# Patient Record
Sex: MALE | Race: WHITE | NOT HISPANIC OR LATINO | Employment: OTHER | URBAN - METROPOLITAN AREA
[De-identification: names, ages, dates, MRNs, and addresses within clinical notes are randomized per-mention and may not be internally consistent; named-entity substitution may affect disease eponyms.]

---

## 2019-01-01 ENCOUNTER — APPOINTMENT (OUTPATIENT)
Dept: CT IMAGING | Facility: HOSPITAL | Age: 74
End: 2019-01-01

## 2019-01-01 ENCOUNTER — APPOINTMENT (OUTPATIENT)
Dept: ULTRASOUND IMAGING | Facility: HOSPITAL | Age: 74
End: 2019-01-01

## 2019-01-01 ENCOUNTER — APPOINTMENT (OUTPATIENT)
Dept: GENERAL RADIOLOGY | Facility: HOSPITAL | Age: 74
End: 2019-01-01

## 2019-01-01 ENCOUNTER — ANESTHESIA (OUTPATIENT)
Dept: GASTROENTEROLOGY | Facility: HOSPITAL | Age: 74
End: 2019-01-01

## 2019-01-01 ENCOUNTER — HOSPITAL ENCOUNTER (EMERGENCY)
Facility: HOSPITAL | Age: 74
Discharge: HOME | End: 2019-04-02
Attending: EMERGENCY MEDICINE | Admitting: EMERGENCY MEDICINE

## 2019-01-01 ENCOUNTER — TREATMENT (OUTPATIENT)
Dept: PHYSICAL THERAPY | Facility: CLINIC | Age: 74
End: 2019-01-01

## 2019-01-01 ENCOUNTER — APPOINTMENT (OUTPATIENT)
Dept: CARDIOLOGY | Facility: HOSPITAL | Age: 74
End: 2019-01-01

## 2019-01-01 ENCOUNTER — HOSPITAL ENCOUNTER (INPATIENT)
Facility: HOSPITAL | Age: 74
LOS: 6 days | Discharge: HOME | End: 2019-12-18
Attending: EMERGENCY MEDICINE | Admitting: HOSPITALIST

## 2019-01-01 ENCOUNTER — OFFICE VISIT (OUTPATIENT)
Dept: INTERNAL MEDICINE | Facility: CLINIC | Age: 74
End: 2019-01-01

## 2019-01-01 ENCOUNTER — READMISSION MANAGEMENT (OUTPATIENT)
Dept: CALL CENTER | Facility: HOSPITAL | Age: 74
End: 2019-01-01

## 2019-01-01 ENCOUNTER — OFFICE VISIT (OUTPATIENT)
Dept: ORTHOPEDIC SURGERY | Facility: CLINIC | Age: 74
End: 2019-01-01

## 2019-01-01 ENCOUNTER — TELEPHONE (OUTPATIENT)
Dept: INTERNAL MEDICINE | Facility: CLINIC | Age: 74
End: 2019-01-01

## 2019-01-01 ENCOUNTER — HOSPITAL ENCOUNTER (OUTPATIENT)
Dept: GENERAL RADIOLOGY | Facility: HOSPITAL | Age: 74
Discharge: HOME | End: 2019-12-11
Admitting: FAMILY MEDICINE

## 2019-01-01 ENCOUNTER — PATIENT OUTREACH (OUTPATIENT)
Dept: CASE MANAGEMENT | Facility: OTHER | Age: 74
End: 2019-01-01

## 2019-01-01 ENCOUNTER — ANESTHESIA EVENT (OUTPATIENT)
Dept: GASTROENTEROLOGY | Facility: HOSPITAL | Age: 74
End: 2019-01-01

## 2019-01-01 ENCOUNTER — APPOINTMENT (OUTPATIENT)
Dept: LAB | Facility: HOSPITAL | Age: 74
End: 2019-01-01

## 2019-01-01 ENCOUNTER — NURSE TRIAGE (OUTPATIENT)
Dept: CALL CENTER | Facility: HOSPITAL | Age: 74
End: 2019-01-01

## 2019-01-01 VITALS
HEIGHT: 72 IN | WEIGHT: 315 LBS | DIASTOLIC BLOOD PRESSURE: 71 MMHG | HEART RATE: 66 BPM | TEMPERATURE: 98.9 F | RESPIRATION RATE: 18 BRPM | BODY MASS INDEX: 42.66 KG/M2 | OXYGEN SATURATION: 95 % | SYSTOLIC BLOOD PRESSURE: 154 MMHG

## 2019-01-01 VITALS
BODY MASS INDEX: 38.13 KG/M2 | SYSTOLIC BLOOD PRESSURE: 99 MMHG | HEART RATE: 72 BPM | WEIGHT: 287.7 LBS | RESPIRATION RATE: 20 BRPM | TEMPERATURE: 98.2 F | OXYGEN SATURATION: 91 % | DIASTOLIC BLOOD PRESSURE: 57 MMHG | HEIGHT: 73 IN

## 2019-01-01 VITALS
HEIGHT: 72 IN | DIASTOLIC BLOOD PRESSURE: 89 MMHG | RESPIRATION RATE: 20 BRPM | OXYGEN SATURATION: 97 % | WEIGHT: 315 LBS | SYSTOLIC BLOOD PRESSURE: 135 MMHG | TEMPERATURE: 97.7 F | HEART RATE: 62 BPM | BODY MASS INDEX: 42.66 KG/M2

## 2019-01-01 VITALS — HEIGHT: 72 IN | BODY MASS INDEX: 42.66 KG/M2 | TEMPERATURE: 98 F | WEIGHT: 315 LBS

## 2019-01-01 VITALS
BODY MASS INDEX: 41.75 KG/M2 | HEIGHT: 73 IN | SYSTOLIC BLOOD PRESSURE: 142 MMHG | OXYGEN SATURATION: 98 % | WEIGHT: 315 LBS | HEART RATE: 71 BPM | DIASTOLIC BLOOD PRESSURE: 70 MMHG

## 2019-01-01 VITALS
SYSTOLIC BLOOD PRESSURE: 142 MMHG | HEART RATE: 58 BPM | BODY MASS INDEX: 41.75 KG/M2 | DIASTOLIC BLOOD PRESSURE: 80 MMHG | OXYGEN SATURATION: 95 % | HEIGHT: 73 IN | WEIGHT: 315 LBS

## 2019-01-01 DIAGNOSIS — I10 ESSENTIAL HYPERTENSION: ICD-10-CM

## 2019-01-01 DIAGNOSIS — K21.9 GASTROESOPHAGEAL REFLUX DISEASE WITHOUT ESOPHAGITIS: ICD-10-CM

## 2019-01-01 DIAGNOSIS — R29.898 LEG WEAKNESS, BILATERAL: Primary | ICD-10-CM

## 2019-01-01 DIAGNOSIS — E78.5 HYPERLIPIDEMIA, UNSPECIFIED HYPERLIPIDEMIA TYPE: ICD-10-CM

## 2019-01-01 DIAGNOSIS — Z79.4 TYPE 2 DIABETES MELLITUS WITH COMPLICATION, WITH LONG-TERM CURRENT USE OF INSULIN (HCC): ICD-10-CM

## 2019-01-01 DIAGNOSIS — R91.8 RIGHT LOWER LOBE LUNG MASS: ICD-10-CM

## 2019-01-01 DIAGNOSIS — N40.0 BENIGN PROSTATIC HYPERPLASIA WITHOUT LOWER URINARY TRACT SYMPTOMS: ICD-10-CM

## 2019-01-01 DIAGNOSIS — R07.89 ATYPICAL CHEST PAIN: Primary | ICD-10-CM

## 2019-01-01 DIAGNOSIS — I71.20 THORACIC AORTIC ANEURYSM WITHOUT RUPTURE (HCC): ICD-10-CM

## 2019-01-01 DIAGNOSIS — E11.8 TYPE 2 DIABETES MELLITUS WITH COMPLICATION, WITH LONG-TERM CURRENT USE OF INSULIN (HCC): ICD-10-CM

## 2019-01-01 DIAGNOSIS — E11.00 TYPE 2 DIABETES MELLITUS WITH HYPEROSMOLARITY WITHOUT COMA, UNSPECIFIED WHETHER LONG TERM INSULIN USE (HCC): Primary | ICD-10-CM

## 2019-01-01 DIAGNOSIS — J18.9 COMMUNITY ACQUIRED PNEUMONIA OF RIGHT LOWER LOBE OF LUNG: Primary | ICD-10-CM

## 2019-01-01 DIAGNOSIS — Z96.652 HX OF TOTAL KNEE ARTHROPLASTY, LEFT: Primary | ICD-10-CM

## 2019-01-01 DIAGNOSIS — R06.02 SHORTNESS OF BREATH ON EXERTION: ICD-10-CM

## 2019-01-01 DIAGNOSIS — F32.A DEPRESSION, UNSPECIFIED DEPRESSION TYPE: ICD-10-CM

## 2019-01-01 DIAGNOSIS — M25.661 KNEE JOINT STIFFNESS, BILATERAL: ICD-10-CM

## 2019-01-01 DIAGNOSIS — Z79.4 TYPE 2 DIABETES MELLITUS WITH COMPLICATION, WITH LONG-TERM CURRENT USE OF INSULIN (HCC): Primary | ICD-10-CM

## 2019-01-01 DIAGNOSIS — R06.02 SHORTNESS OF BREATH ON EXERTION: Primary | ICD-10-CM

## 2019-01-01 DIAGNOSIS — F39 UNSPECIFIED MOOD (AFFECTIVE) DISORDER (HCC): ICD-10-CM

## 2019-01-01 DIAGNOSIS — J96.01 ACUTE HYPOXEMIC RESPIRATORY FAILURE (HCC): Primary | ICD-10-CM

## 2019-01-01 DIAGNOSIS — M25.562 ACUTE PAIN OF LEFT KNEE: ICD-10-CM

## 2019-01-01 DIAGNOSIS — Z74.09 DECREASED MOBILITY AND ENDURANCE: ICD-10-CM

## 2019-01-01 DIAGNOSIS — M25.662 KNEE JOINT STIFFNESS, BILATERAL: ICD-10-CM

## 2019-01-01 DIAGNOSIS — E11.8 TYPE 2 DIABETES MELLITUS WITH COMPLICATION, WITH LONG-TERM CURRENT USE OF INSULIN (HCC): Primary | ICD-10-CM

## 2019-01-01 DIAGNOSIS — R06.02 SHORTNESS OF BREATH: ICD-10-CM

## 2019-01-01 DIAGNOSIS — E27.8 LEFT ADRENAL MASS (HCC): ICD-10-CM

## 2019-01-01 DIAGNOSIS — N52.9 ERECTILE DYSFUNCTION, UNSPECIFIED ERECTILE DYSFUNCTION TYPE: ICD-10-CM

## 2019-01-01 DIAGNOSIS — J90 PLEURAL EFFUSION, RIGHT: ICD-10-CM

## 2019-01-01 DIAGNOSIS — E11.9 TYPE 2 DIABETES MELLITUS WITHOUT COMPLICATION, WITH LONG-TERM CURRENT USE OF INSULIN (HCC): ICD-10-CM

## 2019-01-01 DIAGNOSIS — J90 PLEURAL EFFUSION ON RIGHT: ICD-10-CM

## 2019-01-01 DIAGNOSIS — Z79.4 TYPE 2 DIABETES MELLITUS WITHOUT COMPLICATION, WITH LONG-TERM CURRENT USE OF INSULIN (HCC): ICD-10-CM

## 2019-01-01 LAB
ALBUMIN FLD-MCNC: 3.1 G/DL
ALBUMIN SERPL-MCNC: 3.2 G/DL (ref 3.5–5.2)
ALBUMIN SERPL-MCNC: 3.6 G/DL (ref 3.5–5.2)
ALBUMIN SERPL-MCNC: 4.1 G/DL (ref 3.5–5.2)
ALBUMIN SERPL-MCNC: 4.2 G/DL (ref 3.5–5.2)
ALBUMIN SERPL-MCNC: 4.3 G/DL (ref 3.5–5.2)
ALBUMIN SERPL-MCNC: 4.4 G/DL (ref 3.5–5.2)
ALBUMIN/GLOB SERPL: 0.9 G/DL
ALBUMIN/GLOB SERPL: 1.1 G/DL
ALBUMIN/GLOB SERPL: 1.2 G/DL
ALBUMIN/GLOB SERPL: 1.5 G/DL
ALBUMIN/GLOB SERPL: 1.5 G/DL
ALP SERPL-CCNC: 40 U/L (ref 39–117)
ALP SERPL-CCNC: 54 U/L (ref 39–117)
ALP SERPL-CCNC: 60 U/L (ref 39–117)
ALP SERPL-CCNC: 67 U/L (ref 39–117)
ALP SERPL-CCNC: 70 U/L (ref 39–117)
ALP SERPL-CCNC: 77 U/L (ref 39–117)
ALT SERPL W P-5'-P-CCNC: 15 U/L (ref 1–41)
ALT SERPL W P-5'-P-CCNC: 17 U/L (ref 1–41)
ALT SERPL W P-5'-P-CCNC: 18 U/L (ref 1–41)
ALT SERPL W P-5'-P-CCNC: 19 U/L (ref 1–41)
ALT SERPL W P-5'-P-CCNC: 21 U/L (ref 1–41)
ALT SERPL-CCNC: 14 U/L (ref 1–41)
ANION GAP SERPL CALCULATED.3IONS-SCNC: 10 MMOL/L
ANION GAP SERPL CALCULATED.3IONS-SCNC: 10.5 MMOL/L (ref 5–15)
ANION GAP SERPL CALCULATED.3IONS-SCNC: 10.6 MMOL/L (ref 5–15)
ANION GAP SERPL CALCULATED.3IONS-SCNC: 10.7 MMOL/L (ref 5–15)
ANION GAP SERPL CALCULATED.3IONS-SCNC: 11.1 MMOL/L (ref 5–15)
ANION GAP SERPL CALCULATED.3IONS-SCNC: 14.7 MMOL/L (ref 5–15)
ANION GAP SERPL CALCULATED.3IONS-SCNC: 8.3 MMOL/L (ref 5–15)
ANION GAP SERPL CALCULATED.3IONS-SCNC: 9.3 MMOL/L (ref 5–15)
ANION GAP SERPL CALCULATED.3IONS-SCNC: 9.3 MMOL/L (ref 5–15)
ANION GAP SERPL CALCULATED.3IONS-SCNC: 9.9 MMOL/L (ref 5–15)
ANISOCYTOSIS BLD QL: ABNORMAL
AORTIC DIMENSIONLESS INDEX: 0.8 (DI)
APPEARANCE FLD: ABNORMAL
APTT PPP: 31.6 SECONDS (ref 22.7–35.4)
APTT PPP: 33.9 SECONDS (ref 22.7–35.4)
ARTERIAL PATENCY WRIST A: POSITIVE
AST SERPL-CCNC: 19 U/L (ref 1–40)
AST SERPL-CCNC: 19 U/L (ref 1–40)
AST SERPL-CCNC: 20 U/L (ref 1–40)
AST SERPL-CCNC: 21 U/L (ref 1–40)
AST SERPL-CCNC: 21 U/L (ref 1–40)
AST SERPL-CCNC: 22 U/L (ref 1–40)
ATMOSPHERIC PRESS: 750.3 MMHG
BACTERIA FLD CULT: NORMAL
BACTERIA SPEC AEROBE CULT: ABNORMAL
BACTERIA SPEC AEROBE CULT: NORMAL
BACTERIA SPEC AEROBE CULT: NORMAL
BACTERIA SPEC ANAEROBE CULT: NORMAL
BASE EXCESS BLDA CALC-SCNC: 6.4 MMOL/L (ref 0–2)
BASOPHILS # BLD AUTO: 0.01 10*3/MM3 (ref 0–0.2)
BASOPHILS # BLD AUTO: 0.04 10*3/MM3 (ref 0–0.2)
BASOPHILS # BLD AUTO: 0.04 10*3/MM3 (ref 0–0.2)
BASOPHILS NFR BLD AUTO: 0.2 % (ref 0–1.5)
BASOPHILS NFR BLD AUTO: 0.6 % (ref 0–1.5)
BASOPHILS NFR BLD AUTO: 0.7 % (ref 0–1.5)
BDY SITE: ABNORMAL
BH CV ECHO MEAS - ACS: 2 CM
BH CV ECHO MEAS - AO MAX PG: 10 MMHG
BH CV ECHO MEAS - AO MEAN PG (FULL): 2 MMHG
BH CV ECHO MEAS - AO MEAN PG: 5 MMHG
BH CV ECHO MEAS - AO ROOT AREA (BSA CORRECTED): 1.3
BH CV ECHO MEAS - AO ROOT AREA: 8.6 CM^2
BH CV ECHO MEAS - AO ROOT DIAM: 3.3 CM
BH CV ECHO MEAS - AO V2 MAX: 154 CM/SEC
BH CV ECHO MEAS - AO V2 MEAN: 107 CM/SEC
BH CV ECHO MEAS - AO V2 VTI: 32.5 CM
BH CV ECHO MEAS - AVA(I,A): 3.5 CM^2
BH CV ECHO MEAS - AVA(I,D): 3.5 CM^2
BH CV ECHO MEAS - BSA(HAYCOCK): 2.8 M^2
BH CV ECHO MEAS - BSA: 2.6 M^2
BH CV ECHO MEAS - BZI_BMI: 42.1 KILOGRAMS/M^2
BH CV ECHO MEAS - BZI_METRIC_HEIGHT: 185.4 CM
BH CV ECHO MEAS - BZI_METRIC_WEIGHT: 144.7 KG
BH CV ECHO MEAS - EDV(CUBED): 74.1 ML
BH CV ECHO MEAS - EDV(MOD-SP2): 125 ML
BH CV ECHO MEAS - EDV(MOD-SP4): 106 ML
BH CV ECHO MEAS - EDV(TEICH): 78.6 ML
BH CV ECHO MEAS - EF(CUBED): 19.9 %
BH CV ECHO MEAS - EF(MOD-BP): 75 %
BH CV ECHO MEAS - EF(MOD-SP2): 71.2 %
BH CV ECHO MEAS - EF(MOD-SP4): 71.7 %
BH CV ECHO MEAS - EF(TEICH): 16.1 %
BH CV ECHO MEAS - ESV(CUBED): 59.3 ML
BH CV ECHO MEAS - ESV(MOD-SP2): 36 ML
BH CV ECHO MEAS - ESV(MOD-SP4): 30 ML
BH CV ECHO MEAS - ESV(TEICH): 65.9 ML
BH CV ECHO MEAS - FS: 7.1 %
BH CV ECHO MEAS - IVS/LVPW: 0.88
BH CV ECHO MEAS - IVSD: 1.5 CM
BH CV ECHO MEAS - LA DIMENSION: 5.2 CM
BH CV ECHO MEAS - LA/AO: 1.6
BH CV ECHO MEAS - LAT PEAK E' VEL: 10.6 CM/SEC
BH CV ECHO MEAS - LV DIASTOLIC VOL/BSA (35-75): 40.4 ML/M^2
BH CV ECHO MEAS - LV MASS(C)D: 276.1 GRAMS
BH CV ECHO MEAS - LV MASS(C)DI: 105.2 GRAMS/M^2
BH CV ECHO MEAS - LV MEAN PG: 3 MMHG
BH CV ECHO MEAS - LV SYSTOLIC VOL/BSA (12-30): 11.4 ML/M^2
BH CV ECHO MEAS - LV V1 MAX: 118 CM/SEC
BH CV ECHO MEAS - LV V1 MEAN: 78.4 CM/SEC
BH CV ECHO MEAS - LV V1 VTI: 27.5 CM
BH CV ECHO MEAS - LVIDD: 4.2 CM
BH CV ECHO MEAS - LVIDS: 3.9 CM
BH CV ECHO MEAS - LVLD AP2: 8.6 CM
BH CV ECHO MEAS - LVLD AP4: 8.3 CM
BH CV ECHO MEAS - LVLS AP2: 6.8 CM
BH CV ECHO MEAS - LVLS AP4: 7 CM
BH CV ECHO MEAS - LVOT AREA (M): 4.2 CM^2
BH CV ECHO MEAS - LVOT AREA: 4.2 CM^2
BH CV ECHO MEAS - LVOT DIAM: 2.3 CM
BH CV ECHO MEAS - LVPWD: 1.7 CM
BH CV ECHO MEAS - MED PEAK E' VEL: 10 CM/SEC
BH CV ECHO MEAS - MV A DUR: 0.22 SEC
BH CV ECHO MEAS - MV A MAX VEL: 112 CM/SEC
BH CV ECHO MEAS - MV DEC SLOPE: 481 CM/SEC^2
BH CV ECHO MEAS - MV DEC TIME: 0.24 SEC
BH CV ECHO MEAS - MV E MAX VEL: 129 CM/SEC
BH CV ECHO MEAS - MV E/A: 1.2
BH CV ECHO MEAS - MV MEAN PG: 2 MMHG
BH CV ECHO MEAS - MV P1/2T MAX VEL: 106 CM/SEC
BH CV ECHO MEAS - MV P1/2T: 64.5 MSEC
BH CV ECHO MEAS - MV V2 MEAN: 62.9 CM/SEC
BH CV ECHO MEAS - MV V2 VTI: 39.8 CM
BH CV ECHO MEAS - MVA P1/2T LCG: 2.1 CM^2
BH CV ECHO MEAS - MVA(P1/2T): 3.4 CM^2
BH CV ECHO MEAS - MVA(VTI): 2.9 CM^2
BH CV ECHO MEAS - PA ACC SLOPE: 1322 CM/SEC^2
BH CV ECHO MEAS - PA ACC TIME: 0.07 SEC
BH CV ECHO MEAS - PA MAX PG (FULL): 1.6 MMHG
BH CV ECHO MEAS - PA MAX PG: 3.8 MMHG
BH CV ECHO MEAS - PA PR(ACCEL): 45.7 MMHG
BH CV ECHO MEAS - PA V2 MAX: 97.7 CM/SEC
BH CV ECHO MEAS - PULM A REVS DUR: 0.16 SEC
BH CV ECHO MEAS - PULM A REVS VEL: 23.3 CM/SEC
BH CV ECHO MEAS - PULM DIAS VEL: 36.3 CM/SEC
BH CV ECHO MEAS - PULM S/D: 0.94
BH CV ECHO MEAS - PULM SYS VEL: 34.3 CM/SEC
BH CV ECHO MEAS - PVA(V,A): 4 CM^2
BH CV ECHO MEAS - PVA(V,D): 4 CM^2
BH CV ECHO MEAS - QP/QS: 0.66
BH CV ECHO MEAS - RV MAX PG: 2.2 MMHG
BH CV ECHO MEAS - RV MEAN PG: 1 MMHG
BH CV ECHO MEAS - RV V1 MAX: 73.7 CM/SEC
BH CV ECHO MEAS - RV V1 MEAN: 45.7 CM/SEC
BH CV ECHO MEAS - RV V1 VTI: 14.1 CM
BH CV ECHO MEAS - RVOT AREA: 5.3 CM^2
BH CV ECHO MEAS - RVOT DIAM: 2.6 CM
BH CV ECHO MEAS - SI(AO): 105.9 ML/M^2
BH CV ECHO MEAS - SI(CUBED): 5.6 ML/M^2
BH CV ECHO MEAS - SI(LVOT): 43.5 ML/M^2
BH CV ECHO MEAS - SI(MOD-SP2): 33.9 ML/M^2
BH CV ECHO MEAS - SI(MOD-SP4): 29 ML/M^2
BH CV ECHO MEAS - SI(TEICH): 4.8 ML/M^2
BH CV ECHO MEAS - SV(AO): 278 ML
BH CV ECHO MEAS - SV(CUBED): 14.8 ML
BH CV ECHO MEAS - SV(LVOT): 114.3 ML
BH CV ECHO MEAS - SV(MOD-SP2): 89 ML
BH CV ECHO MEAS - SV(MOD-SP4): 76 ML
BH CV ECHO MEAS - SV(RVOT): 74.9 ML
BH CV ECHO MEAS - SV(TEICH): 12.7 ML
BH CV ECHO MEAS - TAPSE (>1.6): 2.7 CM2
BH CV ECHO MEASUREMENTS AVERAGE E/E' RATIO: 12.52
BH CV LOWER VASCULAR LEFT COMMON FEMORAL AUGMENT: NORMAL
BH CV LOWER VASCULAR LEFT COMMON FEMORAL COMPETENT: NORMAL
BH CV LOWER VASCULAR LEFT COMMON FEMORAL COMPRESS: NORMAL
BH CV LOWER VASCULAR LEFT COMMON FEMORAL PHASIC: NORMAL
BH CV LOWER VASCULAR LEFT COMMON FEMORAL SPONT: NORMAL
BH CV LOWER VASCULAR LEFT DISTAL FEMORAL COMPRESS: NORMAL
BH CV LOWER VASCULAR LEFT GASTRONEMIUS COMPRESS: NORMAL
BH CV LOWER VASCULAR LEFT GREATER SAPH BK COMPRESS: NORMAL
BH CV LOWER VASCULAR LEFT MID FEMORAL AUGMENT: NORMAL
BH CV LOWER VASCULAR LEFT MID FEMORAL COMPETENT: NORMAL
BH CV LOWER VASCULAR LEFT MID FEMORAL COMPRESS: NORMAL
BH CV LOWER VASCULAR LEFT MID FEMORAL PHASIC: NORMAL
BH CV LOWER VASCULAR LEFT MID FEMORAL SPONT: NORMAL
BH CV LOWER VASCULAR LEFT PERONEAL COMPRESS: NORMAL
BH CV LOWER VASCULAR LEFT POPLITEAL AUGMENT: NORMAL
BH CV LOWER VASCULAR LEFT POPLITEAL COMPETENT: NORMAL
BH CV LOWER VASCULAR LEFT POPLITEAL COMPRESS: NORMAL
BH CV LOWER VASCULAR LEFT POPLITEAL PHASIC: NORMAL
BH CV LOWER VASCULAR LEFT POPLITEAL SPONT: NORMAL
BH CV LOWER VASCULAR LEFT POSTERIOR TIBIAL COMPRESS: NORMAL
BH CV LOWER VASCULAR LEFT PROFUNDA FEMORAL COMPRESS: NORMAL
BH CV LOWER VASCULAR LEFT PROXIMAL FEMORAL COMPRESS: NORMAL
BH CV LOWER VASCULAR LEFT SAPHENOFEMORAL JUNCTION COMPRESS: NORMAL
BH CV LOWER VASCULAR RIGHT COMMON FEMORAL AUGMENT: NORMAL
BH CV LOWER VASCULAR RIGHT COMMON FEMORAL COMPETENT: NORMAL
BH CV LOWER VASCULAR RIGHT COMMON FEMORAL COMPRESS: NORMAL
BH CV LOWER VASCULAR RIGHT COMMON FEMORAL PHASIC: NORMAL
BH CV LOWER VASCULAR RIGHT COMMON FEMORAL SPONT: NORMAL
BH CV LOWER VASCULAR RIGHT DISTAL FEMORAL COMPRESS: NORMAL
BH CV LOWER VASCULAR RIGHT GASTRONEMIUS COMPRESS: NORMAL
BH CV LOWER VASCULAR RIGHT GREATER SAPH AK COMPRESS: NORMAL
BH CV LOWER VASCULAR RIGHT GREATER SAPH BK COMPRESS: NORMAL
BH CV LOWER VASCULAR RIGHT MID FEMORAL AUGMENT: NORMAL
BH CV LOWER VASCULAR RIGHT MID FEMORAL COMPETENT: NORMAL
BH CV LOWER VASCULAR RIGHT MID FEMORAL COMPRESS: NORMAL
BH CV LOWER VASCULAR RIGHT MID FEMORAL PHASIC: NORMAL
BH CV LOWER VASCULAR RIGHT MID FEMORAL SPONT: NORMAL
BH CV LOWER VASCULAR RIGHT PERONEAL COMPRESS: NORMAL
BH CV LOWER VASCULAR RIGHT POPLITEAL AUGMENT: NORMAL
BH CV LOWER VASCULAR RIGHT POPLITEAL COMPETENT: NORMAL
BH CV LOWER VASCULAR RIGHT POPLITEAL COMPRESS: NORMAL
BH CV LOWER VASCULAR RIGHT POPLITEAL PHASIC: NORMAL
BH CV LOWER VASCULAR RIGHT POPLITEAL SPONT: NORMAL
BH CV LOWER VASCULAR RIGHT POSTERIOR TIBIAL COMPRESS: NORMAL
BH CV LOWER VASCULAR RIGHT PROFUNDA FEMORAL COMPRESS: NORMAL
BH CV LOWER VASCULAR RIGHT PROXIMAL FEMORAL COMPRESS: NORMAL
BH CV LOWER VASCULAR RIGHT SAPHENOFEMORAL JUNCTION COMPRESS: NORMAL
BH CV XLRA - RV BASE: 3.8 CM
BH CV XLRA - TDI S': 12 CM/SEC
BILIRUB CONJ SERPL-MCNC: 0.2 MG/DL (ref 0.2–0.3)
BILIRUB INDIRECT SERPL-MCNC: 0.4 MG/DL
BILIRUB SERPL-MCNC: 0.4 MG/DL (ref 0.1–1.2)
BILIRUB SERPL-MCNC: 0.4 MG/DL (ref 0.2–1.2)
BILIRUB SERPL-MCNC: 0.6 MG/DL (ref 0.2–1.2)
BILIRUB SERPL-MCNC: 0.7 MG/DL (ref 0.2–1.2)
BILIRUB SERPL-MCNC: 0.7 MG/DL (ref 0.2–1.2)
BILIRUB SERPL-MCNC: 0.8 MG/DL (ref 0.2–1.2)
BUN BLD-MCNC: 16 MG/DL (ref 8–23)
BUN BLD-MCNC: 20 MG/DL (ref 8–23)
BUN BLD-MCNC: 22 MG/DL (ref 8–23)
BUN BLD-MCNC: 23 MG/DL (ref 8–23)
BUN BLD-MCNC: 24 MG/DL (ref 8–23)
BUN BLD-MCNC: 25 MG/DL (ref 8–23)
BUN BLD-MCNC: 26 MG/DL (ref 8–23)
BUN SERPL-MCNC: 27 MG/DL (ref 8–23)
BUN/CREAT SERPL: 12.8 (ref 7–25)
BUN/CREAT SERPL: 14 (ref 7–25)
BUN/CREAT SERPL: 14.6 (ref 7–25)
BUN/CREAT SERPL: 14.7 (ref 7–25)
BUN/CREAT SERPL: 15.3 (ref 7–25)
BUN/CREAT SERPL: 15.5 (ref 7–25)
BUN/CREAT SERPL: 15.8 (ref 7–25)
BUN/CREAT SERPL: 16.5 (ref 7–25)
BUN/CREAT SERPL: 17.8 (ref 7–25)
BUN/CREAT SERPL: 17.9 (ref 7–25)
BUN/CREAT SERPL: 19.8 (ref 7–25)
CALCIUM SERPL-MCNC: 9.9 MG/DL (ref 8.6–10.5)
CALCIUM SPEC-SCNC: 8.3 MG/DL (ref 8.6–10.5)
CALCIUM SPEC-SCNC: 8.5 MG/DL (ref 8.6–10.5)
CALCIUM SPEC-SCNC: 8.6 MG/DL (ref 8.6–10.5)
CALCIUM SPEC-SCNC: 8.8 MG/DL (ref 8.6–10.5)
CALCIUM SPEC-SCNC: 8.8 MG/DL (ref 8.6–10.5)
CALCIUM SPEC-SCNC: 8.9 MG/DL (ref 8.6–10.5)
CALCIUM SPEC-SCNC: 9 MG/DL (ref 8.6–10.5)
CALCIUM SPEC-SCNC: 9.1 MG/DL (ref 8.6–10.5)
CALCIUM SPEC-SCNC: 9.4 MG/DL (ref 8.6–10.5)
CALCIUM SPEC-SCNC: 9.4 MG/DL (ref 8.6–10.5)
CHLORIDE SERPL-SCNC: 100 MMOL/L (ref 98–107)
CHLORIDE SERPL-SCNC: 103 MMOL/L (ref 98–107)
CHLORIDE SERPL-SCNC: 104 MMOL/L (ref 98–107)
CHLORIDE SERPL-SCNC: 96 MMOL/L (ref 98–107)
CHLORIDE SERPL-SCNC: 96 MMOL/L (ref 98–107)
CHLORIDE SERPL-SCNC: 97 MMOL/L (ref 98–107)
CHLORIDE SERPL-SCNC: 98 MMOL/L (ref 98–107)
CHLORIDE SERPL-SCNC: 99 MMOL/L (ref 98–107)
CHOLEST SERPL-MCNC: 136 MG/DL (ref 0–200)
CHOLEST SERPL-MCNC: 146 MG/DL (ref 0–200)
CO2 SERPL-SCNC: 28.1 MMOL/L (ref 22–29)
CO2 SERPL-SCNC: 28.3 MMOL/L (ref 22–29)
CO2 SERPL-SCNC: 29 MMOL/L (ref 22–29)
CO2 SERPL-SCNC: 29.3 MMOL/L (ref 22–29)
CO2 SERPL-SCNC: 29.9 MMOL/L (ref 22–29)
CO2 SERPL-SCNC: 30.4 MMOL/L (ref 22–29)
CO2 SERPL-SCNC: 30.5 MMOL/L (ref 22–29)
CO2 SERPL-SCNC: 30.7 MMOL/L (ref 22–29)
CO2 SERPL-SCNC: 30.8 MMOL/L (ref 22–29)
CO2 SERPL-SCNC: 31.7 MMOL/L (ref 22–29)
CO2 SERPL-SCNC: 32.7 MMOL/L (ref 22–29)
COLOR FLD: YELLOW
CREAT BLD-MCNC: 1.25 MG/DL (ref 0.76–1.27)
CREAT BLD-MCNC: 1.26 MG/DL (ref 0.76–1.27)
CREAT BLD-MCNC: 1.29 MG/DL (ref 0.76–1.27)
CREAT BLD-MCNC: 1.31 MG/DL (ref 0.76–1.27)
CREAT BLD-MCNC: 1.34 MG/DL (ref 0.76–1.27)
CREAT BLD-MCNC: 1.37 MG/DL (ref 0.76–1.27)
CREAT BLD-MCNC: 1.39 MG/DL (ref 0.76–1.27)
CREAT BLD-MCNC: 1.39 MG/DL (ref 0.76–1.27)
CREAT BLD-MCNC: 1.43 MG/DL (ref 0.76–1.27)
CREAT BLD-MCNC: 1.46 MG/DL (ref 0.76–1.27)
CREAT SERPL-MCNC: 1.84 MG/DL (ref 0.76–1.27)
CYTO UR: NORMAL
D-LACTATE SERPL-SCNC: 1.8 MMOL/L (ref 0.5–2)
DEPRECATED RDW RBC AUTO: 43.7 FL (ref 37–54)
DEPRECATED RDW RBC AUTO: 44.3 FL (ref 37–54)
DEPRECATED RDW RBC AUTO: 45 FL (ref 37–54)
DEPRECATED RDW RBC AUTO: 45 FL (ref 37–54)
DEPRECATED RDW RBC AUTO: 45.3 FL (ref 37–54)
DEPRECATED RDW RBC AUTO: 45.4 FL (ref 37–54)
DEPRECATED RDW RBC AUTO: 47 FL (ref 37–54)
DEPRECATED RDW RBC AUTO: 49.7 FL (ref 37–54)
EOSINOPHIL # BLD AUTO: 0.01 10*3/MM3 (ref 0–0.4)
EOSINOPHIL # BLD AUTO: 0.12 10*3/MM3 (ref 0–0.4)
EOSINOPHIL # BLD AUTO: 0.14 10*3/MM3 (ref 0–0.4)
EOSINOPHIL # BLD MANUAL: 0.07 10*3/MM3 (ref 0–0.4)
EOSINOPHIL NFR BLD AUTO: 0.2 % (ref 0.3–6.2)
EOSINOPHIL NFR BLD AUTO: 1.8 % (ref 0.3–6.2)
EOSINOPHIL NFR BLD AUTO: 2.3 % (ref 0.3–6.2)
EOSINOPHIL NFR BLD MANUAL: 1 % (ref 0.3–6.2)
ERYTHROCYTE [DISTWIDTH] IN BLOOD BY AUTOMATED COUNT: 14.1 % (ref 12.3–15.4)
ERYTHROCYTE [DISTWIDTH] IN BLOOD BY AUTOMATED COUNT: 14.1 % (ref 12.3–15.4)
ERYTHROCYTE [DISTWIDTH] IN BLOOD BY AUTOMATED COUNT: 14.2 % (ref 12.3–15.4)
ERYTHROCYTE [DISTWIDTH] IN BLOOD BY AUTOMATED COUNT: 14.3 % (ref 12.3–15.4)
ERYTHROCYTE [DISTWIDTH] IN BLOOD BY AUTOMATED COUNT: 14.6 % (ref 12.3–15.4)
ERYTHROCYTE [DISTWIDTH] IN BLOOD BY AUTOMATED COUNT: 14.7 % (ref 12.3–15.4)
GFR SERPL CREATININE-BSD FRML MDRD: 47 ML/MIN/1.73
GFR SERPL CREATININE-BSD FRML MDRD: 48 ML/MIN/1.73
GFR SERPL CREATININE-BSD FRML MDRD: 50 ML/MIN/1.73
GFR SERPL CREATININE-BSD FRML MDRD: 50 ML/MIN/1.73
GFR SERPL CREATININE-BSD FRML MDRD: 51 ML/MIN/1.73
GFR SERPL CREATININE-BSD FRML MDRD: 52 ML/MIN/1.73
GFR SERPL CREATININE-BSD FRML MDRD: 54 ML/MIN/1.73
GFR SERPL CREATININE-BSD FRML MDRD: 55 ML/MIN/1.73
GFR SERPL CREATININE-BSD FRML MDRD: 56 ML/MIN/1.73
GFR SERPL CREATININE-BSD FRML MDRD: 57 ML/MIN/1.73
GLOBULIN SER CALC-MCNC: 2.9 GM/DL
GLOBULIN UR ELPH-MCNC: 2.9 GM/DL
GLOBULIN UR ELPH-MCNC: 3.5 GM/DL
GLOBULIN UR ELPH-MCNC: 3.7 GM/DL
GLOBULIN UR ELPH-MCNC: 3.7 GM/DL
GLUCOSE BLD-MCNC: 116 MG/DL (ref 65–99)
GLUCOSE BLD-MCNC: 133 MG/DL (ref 65–99)
GLUCOSE BLD-MCNC: 134 MG/DL (ref 65–99)
GLUCOSE BLD-MCNC: 142 MG/DL (ref 65–99)
GLUCOSE BLD-MCNC: 143 MG/DL (ref 65–99)
GLUCOSE BLD-MCNC: 159 MG/DL (ref 65–99)
GLUCOSE BLD-MCNC: 167 MG/DL (ref 65–99)
GLUCOSE BLD-MCNC: 179 MG/DL (ref 65–99)
GLUCOSE BLD-MCNC: 58 MG/DL (ref 65–99)
GLUCOSE BLD-MCNC: 68 MG/DL (ref 65–99)
GLUCOSE BLDC GLUCOMTR-MCNC: 111 MG/DL (ref 70–130)
GLUCOSE BLDC GLUCOMTR-MCNC: 119 MG/DL (ref 70–130)
GLUCOSE BLDC GLUCOMTR-MCNC: 124 MG/DL (ref 70–130)
GLUCOSE BLDC GLUCOMTR-MCNC: 131 MG/DL (ref 70–130)
GLUCOSE BLDC GLUCOMTR-MCNC: 132 MG/DL (ref 70–130)
GLUCOSE BLDC GLUCOMTR-MCNC: 132 MG/DL (ref 70–130)
GLUCOSE BLDC GLUCOMTR-MCNC: 135 MG/DL (ref 70–130)
GLUCOSE BLDC GLUCOMTR-MCNC: 137 MG/DL (ref 70–130)
GLUCOSE BLDC GLUCOMTR-MCNC: 143 MG/DL (ref 70–130)
GLUCOSE BLDC GLUCOMTR-MCNC: 150 MG/DL (ref 70–130)
GLUCOSE BLDC GLUCOMTR-MCNC: 153 MG/DL (ref 70–130)
GLUCOSE BLDC GLUCOMTR-MCNC: 154 MG/DL (ref 70–130)
GLUCOSE BLDC GLUCOMTR-MCNC: 154 MG/DL (ref 70–130)
GLUCOSE BLDC GLUCOMTR-MCNC: 155 MG/DL (ref 70–130)
GLUCOSE BLDC GLUCOMTR-MCNC: 157 MG/DL (ref 70–130)
GLUCOSE BLDC GLUCOMTR-MCNC: 161 MG/DL (ref 70–130)
GLUCOSE BLDC GLUCOMTR-MCNC: 168 MG/DL (ref 70–130)
GLUCOSE BLDC GLUCOMTR-MCNC: 176 MG/DL (ref 70–130)
GLUCOSE BLDC GLUCOMTR-MCNC: 182 MG/DL (ref 70–130)
GLUCOSE BLDC GLUCOMTR-MCNC: 188 MG/DL (ref 70–130)
GLUCOSE BLDC GLUCOMTR-MCNC: 191 MG/DL (ref 70–130)
GLUCOSE BLDC GLUCOMTR-MCNC: 195 MG/DL (ref 70–130)
GLUCOSE BLDC GLUCOMTR-MCNC: 196 MG/DL (ref 70–130)
GLUCOSE BLDC GLUCOMTR-MCNC: 251 MG/DL (ref 70–130)
GLUCOSE BLDC GLUCOMTR-MCNC: 85 MG/DL (ref 70–130)
GLUCOSE FLD-MCNC: 132 MG/DL
GLUCOSE SERPL-MCNC: 96 MG/DL (ref 65–99)
GRAM STN SPEC: ABNORMAL
GRAM STN SPEC: ABNORMAL
GRAM STN SPEC: NORMAL
GRAM STN SPEC: NORMAL
HBA1C MFR BLD: 6 % (ref 4.8–5.6)
HBA1C MFR BLD: 6.39 % (ref 4.8–5.6)
HCO3 BLDA-SCNC: 32.4 MMOL/L (ref 22–28)
HCT VFR BLD AUTO: 35.9 % (ref 37.5–51)
HCT VFR BLD AUTO: 36.7 % (ref 37.5–51)
HCT VFR BLD AUTO: 37.4 % (ref 37.5–51)
HCT VFR BLD AUTO: 38.7 % (ref 37.5–51)
HCT VFR BLD AUTO: 39.1 % (ref 37.5–51)
HCT VFR BLD AUTO: 40.1 % (ref 37.5–51)
HCT VFR BLD AUTO: 42 % (ref 37.5–51)
HCT VFR BLD AUTO: 42.1 % (ref 37.5–51)
HDLC SERPL-MCNC: 52 MG/DL (ref 40–60)
HDLC SERPL-MCNC: 59 MG/DL (ref 40–60)
HGB BLD-MCNC: 11.6 G/DL (ref 13–17.7)
HGB BLD-MCNC: 12.1 G/DL (ref 13–17.7)
HGB BLD-MCNC: 12.2 G/DL (ref 13–17.7)
HGB BLD-MCNC: 12.4 G/DL (ref 13–17.7)
HGB BLD-MCNC: 12.9 G/DL (ref 13–17.7)
HGB BLD-MCNC: 13.1 G/DL (ref 13–17.7)
HGB BLD-MCNC: 13.1 G/DL (ref 13–17.7)
HGB BLD-MCNC: 13.5 G/DL (ref 13–17.7)
HOLD SPECIMEN: NORMAL
HOLD SPECIMEN: NORMAL
IMM GRANULOCYTES # BLD AUTO: 0.03 10*3/MM3 (ref 0–0.05)
IMM GRANULOCYTES # BLD AUTO: 0.04 10*3/MM3 (ref 0–0.05)
IMM GRANULOCYTES NFR BLD AUTO: 0.5 % (ref 0–0.5)
IMM GRANULOCYTES NFR BLD AUTO: 0.7 % (ref 0–0.5)
INR PPP: 1.17 (ref 0.9–1.1)
INR PPP: 1.21 (ref 0.9–1.1)
INR PPP: 1.22 (ref 0.9–1.1)
LAB AP CASE REPORT: NORMAL
LAB AP DIAGNOSIS COMMENT: NORMAL
LDH FLD-CCNC: 168 U/L
LDH SERPL-CCNC: 203 U/L (ref 135–225)
LDLC SERPL CALC-MCNC: 68 MG/DL (ref 0–100)
LDLC SERPL CALC-MCNC: 71 MG/DL (ref 0–100)
LDLC/HDLC SERPL: 1.2 {RATIO}
LDLC/HDLC SERPL: 1.3 {RATIO}
LEFT ATRIUM VOLUME INDEX: 25 ML/M2
LYMPHOCYTES # BLD AUTO: 0.4 10*3/MM3 (ref 0.7–3.1)
LYMPHOCYTES # BLD AUTO: 0.73 10*3/MM3 (ref 0.7–3.1)
LYMPHOCYTES # BLD AUTO: 1.29 10*3/MM3 (ref 0.7–3.1)
LYMPHOCYTES # BLD MANUAL: 0.96 10*3/MM3 (ref 0.7–3.1)
LYMPHOCYTES NFR BLD AUTO: 10.9 % (ref 19.6–45.3)
LYMPHOCYTES NFR BLD AUTO: 21.4 % (ref 19.6–45.3)
LYMPHOCYTES NFR BLD AUTO: 7.3 % (ref 19.6–45.3)
LYMPHOCYTES NFR BLD MANUAL: 14 % (ref 19.6–45.3)
LYMPHOCYTES NFR BLD MANUAL: 5 % (ref 5–12)
LYMPHOCYTES NFR FLD MANUAL: 20 %
MAXIMAL PREDICTED HEART RATE: 147 BPM
MCH RBC QN AUTO: 27.1 PG (ref 26.6–33)
MCH RBC QN AUTO: 27.6 PG (ref 26.6–33)
MCH RBC QN AUTO: 28 PG (ref 26.6–33)
MCH RBC QN AUTO: 28.2 PG (ref 26.6–33)
MCH RBC QN AUTO: 28.6 PG (ref 26.6–33)
MCH RBC QN AUTO: 28.9 PG (ref 26.6–33)
MCH RBC QN AUTO: 29 PG (ref 26.6–33)
MCH RBC QN AUTO: 29.1 PG (ref 26.6–33)
MCHC RBC AUTO-ENTMCNC: 31 G/DL (ref 31.5–35.7)
MCHC RBC AUTO-ENTMCNC: 31.1 G/DL (ref 31.5–35.7)
MCHC RBC AUTO-ENTMCNC: 32 G/DL (ref 31.5–35.7)
MCHC RBC AUTO-ENTMCNC: 32.1 G/DL (ref 31.5–35.7)
MCHC RBC AUTO-ENTMCNC: 32.2 G/DL (ref 31.5–35.7)
MCHC RBC AUTO-ENTMCNC: 33.2 G/DL (ref 31.5–35.7)
MCHC RBC AUTO-ENTMCNC: 33.5 G/DL (ref 31.5–35.7)
MCHC RBC AUTO-ENTMCNC: 33.7 G/DL (ref 31.5–35.7)
MCV RBC AUTO: 86 FL (ref 79–97)
MCV RBC AUTO: 86.1 FL (ref 79–97)
MCV RBC AUTO: 86.9 FL (ref 79–97)
MCV RBC AUTO: 87 FL (ref 79–97)
MCV RBC AUTO: 87.1 FL (ref 79–97)
MCV RBC AUTO: 87.2 FL (ref 79–97)
MCV RBC AUTO: 87.7 FL (ref 79–97)
MCV RBC AUTO: 92.3 FL (ref 79–97)
MODALITY: ABNORMAL
MONOCYTES # BLD AUTO: 0.23 10*3/MM3 (ref 0.1–0.9)
MONOCYTES # BLD AUTO: 0.34 10*3/MM3 (ref 0.1–0.9)
MONOCYTES # BLD AUTO: 0.73 10*3/MM3 (ref 0.1–0.9)
MONOCYTES # BLD AUTO: 0.74 10*3/MM3 (ref 0.1–0.9)
MONOCYTES NFR BLD AUTO: 10.9 % (ref 5–12)
MONOCYTES NFR BLD AUTO: 12.3 % (ref 5–12)
MONOCYTES NFR BLD AUTO: 4.2 % (ref 5–12)
MONOS+MACROS NFR FLD: 77 %
NEUTROPHILS # BLD AUTO: 3.79 10*3/MM3 (ref 1.4–7)
NEUTROPHILS # BLD AUTO: 4.78 10*3/MM3 (ref 1.7–7)
NEUTROPHILS # BLD AUTO: 5.01 10*3/MM3 (ref 1.7–7)
NEUTROPHILS # BLD AUTO: 5.49 10*3/MM3 (ref 1.7–7)
NEUTROPHILS NFR BLD AUTO: 62.6 % (ref 42.7–76)
NEUTROPHILS NFR BLD AUTO: 75.1 % (ref 42.7–76)
NEUTROPHILS NFR BLD AUTO: 87.6 % (ref 42.7–76)
NEUTROPHILS NFR BLD MANUAL: 80 % (ref 42.7–76)
NEUTROPHILS NFR FLD MANUAL: 3 %
NRBC BLD AUTO-RTO: 0 /100 WBC (ref 0–0)
NRBC BLD AUTO-RTO: 0 /100 WBC (ref 0–0.2)
NT-PROBNP SERPL-MCNC: 394.2 PG/ML (ref 5–900)
NT-PROBNP SERPL-MCNC: 541 PG/ML (ref 5–900)
OTHER CELLS FLUID PER 100/WBCS: 4 /100 WBCS
PATH REPORT.ADDENDUM SPEC: NORMAL
PATH REPORT.FINAL DX SPEC: NORMAL
PATH REPORT.GROSS SPEC: NORMAL
PCO2 BLDA: 51.4 MM HG (ref 35–45)
PH BLDA: 7.41 PH UNITS (ref 7.35–7.45)
PH FLD: 7.48 [PH]
PLAT MORPH BLD: NORMAL
PLATELET # BLD AUTO: 102 10*3/MM3 (ref 140–450)
PLATELET # BLD AUTO: 103 10*3/MM3 (ref 140–450)
PLATELET # BLD AUTO: 107 10*3/MM3 (ref 140–450)
PLATELET # BLD AUTO: 111 10*3/MM3 (ref 140–450)
PLATELET # BLD AUTO: 133 10*3/MM3 (ref 140–450)
PLATELET # BLD AUTO: 141 10*3/MM3 (ref 140–450)
PLATELET # BLD AUTO: 145 10*3/MM3 (ref 140–450)
PLATELET # BLD AUTO: 89 10*3/MM3 (ref 140–450)
PMV BLD AUTO: 10.1 FL (ref 6–12)
PMV BLD AUTO: 9.2 FL (ref 6–12)
PMV BLD AUTO: 9.3 FL (ref 6–12)
PMV BLD AUTO: 9.7 FL (ref 6–12)
PMV BLD AUTO: 9.7 FL (ref 6–12)
PMV BLD AUTO: 9.8 FL (ref 6–12)
PMV BLD AUTO: 9.8 FL (ref 6–12)
PMV BLD AUTO: 9.9 FL (ref 6–12)
PO2 BLDA: 53.8 MM HG (ref 80–100)
POLYCHROMASIA BLD QL SMEAR: ABNORMAL
POTASSIUM BLD-SCNC: 3.7 MMOL/L (ref 3.5–5.2)
POTASSIUM BLD-SCNC: 3.8 MMOL/L (ref 3.5–5.2)
POTASSIUM BLD-SCNC: 3.9 MMOL/L (ref 3.5–5.2)
POTASSIUM BLD-SCNC: 4 MMOL/L (ref 3.5–5.2)
POTASSIUM BLD-SCNC: 4 MMOL/L (ref 3.5–5.2)
POTASSIUM BLD-SCNC: 4.1 MMOL/L (ref 3.5–5.2)
POTASSIUM BLD-SCNC: 4.2 MMOL/L (ref 3.5–5.2)
POTASSIUM BLD-SCNC: 4.3 MMOL/L (ref 3.5–5.2)
POTASSIUM SERPL-SCNC: 4.9 MMOL/L (ref 3.5–5.2)
PROCALCITONIN SERPL-MCNC: 0.14 NG/ML (ref 0.1–0.25)
PROT FLD-MCNC: 4.8 G/DL
PROT SERPL-MCNC: 6.9 G/DL (ref 6–8.5)
PROT SERPL-MCNC: 7 G/DL (ref 6–8.5)
PROT SERPL-MCNC: 7.2 G/DL (ref 6–8.5)
PROT SERPL-MCNC: 7.3 G/DL (ref 6–8.5)
PROT SERPL-MCNC: 7.6 G/DL (ref 6–8.5)
PROT SERPL-MCNC: 7.9 G/DL (ref 6–8.5)
PROTHROMBIN TIME: 14.6 SECONDS (ref 11.7–14.2)
PROTHROMBIN TIME: 15 SECONDS (ref 11.7–14.2)
PROTHROMBIN TIME: 15.1 SECONDS (ref 11.7–14.2)
RBC # BLD AUTO: 4.05 10*6/MM3 (ref 4.14–5.8)
RBC # BLD AUTO: 4.17 10*6/MM3 (ref 4.14–5.8)
RBC # BLD AUTO: 4.22 10*6/MM3 (ref 4.14–5.8)
RBC # BLD AUTO: 4.5 10*6/MM3 (ref 4.14–5.8)
RBC # BLD AUTO: 4.5 10*6/MM3 (ref 4.14–5.8)
RBC # BLD AUTO: 4.57 10*6/MM3 (ref 4.14–5.8)
RBC # BLD AUTO: 4.82 10*6/MM3 (ref 4.14–5.8)
RBC # BLD AUTO: 4.83 10*6/MM3 (ref 4.14–5.8)
RBC # FLD AUTO: 1460 /MM3
REF LAB TEST METHOD: NORMAL
SAO2 % BLDCOA: 87.1 % (ref 92–99)
SET MECH RESP RATE: 20
SODIUM BLD-SCNC: 136 MMOL/L (ref 136–145)
SODIUM BLD-SCNC: 137 MMOL/L (ref 136–145)
SODIUM BLD-SCNC: 137 MMOL/L (ref 136–145)
SODIUM BLD-SCNC: 138 MMOL/L (ref 136–145)
SODIUM BLD-SCNC: 138 MMOL/L (ref 136–145)
SODIUM BLD-SCNC: 139 MMOL/L (ref 136–145)
SODIUM BLD-SCNC: 140 MMOL/L (ref 136–145)
SODIUM BLD-SCNC: 140 MMOL/L (ref 136–145)
SODIUM BLD-SCNC: 141 MMOL/L (ref 136–145)
SODIUM BLD-SCNC: 143 MMOL/L (ref 136–145)
SODIUM SERPL-SCNC: 143 MMOL/L (ref 136–145)
STRESS TARGET HR: 125 BPM
TRIGL SERPL-MCNC: 80 MG/DL (ref 0–150)
TRIGL SERPL-MCNC: 81 MG/DL (ref 0–150)
TROPONIN T SERPL-MCNC: <0.01 NG/ML (ref 0–0.03)
TROPONIN T SERPL-MCNC: <0.01 NG/ML (ref 0–0.03)
VLDLC SERPL CALC-MCNC: 16.2 MG/DL (ref 5–40)
VLDLC SERPL-MCNC: 16 MG/DL (ref 5–40)
WBC # FLD AUTO: 280 /MM3
WBC MORPH BLD: NORMAL
WBC NRBC COR # BLD: 4.68 10*3/MM3 (ref 3.4–10.8)
WBC NRBC COR # BLD: 5.46 10*3/MM3 (ref 3.4–10.8)
WBC NRBC COR # BLD: 5.54 10*3/MM3 (ref 3.4–10.8)
WBC NRBC COR # BLD: 6.04 10*3/MM3 (ref 3.4–10.8)
WBC NRBC COR # BLD: 6.43 10*3/MM3 (ref 3.4–10.8)
WBC NRBC COR # BLD: 6.68 10*3/MM3 (ref 3.4–10.8)
WBC NRBC COR # BLD: 6.71 10*3/MM3 (ref 3.4–10.8)
WBC NRBC COR # BLD: 6.86 10*3/MM3 (ref 3.4–10.8)
WHOLE BLOOD HOLD SPECIMEN: NORMAL
WHOLE BLOOD HOLD SPECIMEN: NORMAL

## 2019-01-01 PROCEDURE — 97116 GAIT TRAINING THERAPY: CPT

## 2019-01-01 PROCEDURE — 25010000002 EPINEPHRINE PER 0.1 MG: Performed by: INTERNAL MEDICINE

## 2019-01-01 PROCEDURE — 85027 COMPLETE CBC AUTOMATED: CPT | Performed by: HOSPITALIST

## 2019-01-01 PROCEDURE — 80076 HEPATIC FUNCTION PANEL: CPT | Performed by: INTERNAL MEDICINE

## 2019-01-01 PROCEDURE — 63710000001 INSULIN LISPRO (HUMAN) PER 5 UNITS: Performed by: INTERNAL MEDICINE

## 2019-01-01 PROCEDURE — 84484 ASSAY OF TROPONIN QUANT: CPT | Performed by: EMERGENCY MEDICINE

## 2019-01-01 PROCEDURE — 94618 PULMONARY STRESS TESTING: CPT

## 2019-01-01 PROCEDURE — 85610 PROTHROMBIN TIME: CPT | Performed by: INTERNAL MEDICINE

## 2019-01-01 PROCEDURE — 85007 BL SMEAR W/DIFF WBC COUNT: CPT | Performed by: FAMILY MEDICINE

## 2019-01-01 PROCEDURE — 85025 COMPLETE CBC W/AUTO DIFF WBC: CPT | Performed by: EMERGENCY MEDICINE

## 2019-01-01 PROCEDURE — 36415 COLL VENOUS BLD VENIPUNCTURE: CPT | Performed by: NURSE PRACTITIONER

## 2019-01-01 PROCEDURE — 94799 UNLISTED PULMONARY SVC/PX: CPT

## 2019-01-01 PROCEDURE — 71046 X-RAY EXAM CHEST 2 VIEWS: CPT

## 2019-01-01 PROCEDURE — 63710000001 INSULIN GLARGINE PER 5 UNITS: Performed by: INTERNAL MEDICINE

## 2019-01-01 PROCEDURE — G0008 ADMIN INFLUENZA VIRUS VAC: HCPCS | Performed by: FAMILY MEDICINE

## 2019-01-01 PROCEDURE — 99203 OFFICE O/P NEW LOW 30 MIN: CPT | Performed by: ORTHOPAEDIC SURGERY

## 2019-01-01 PROCEDURE — 88184 FLOWCYTOMETRY/ TC 1 MARKER: CPT | Performed by: INTERNAL MEDICINE

## 2019-01-01 PROCEDURE — 71250 CT THORAX DX C-: CPT

## 2019-01-01 PROCEDURE — 89051 BODY FLUID CELL COUNT: CPT | Performed by: INTERNAL MEDICINE

## 2019-01-01 PROCEDURE — 0 IOPAMIDOL PER 1 ML: Performed by: EMERGENCY MEDICINE

## 2019-01-01 PROCEDURE — 90653 IIV ADJUVANT VACCINE IM: CPT | Performed by: FAMILY MEDICINE

## 2019-01-01 PROCEDURE — 97161 PT EVAL LOW COMPLEX 20 MIN: CPT | Performed by: PHYSICAL THERAPIST

## 2019-01-01 PROCEDURE — 82042 OTHER SOURCE ALBUMIN QUAN EA: CPT | Performed by: INTERNAL MEDICINE

## 2019-01-01 PROCEDURE — 99284 EMERGENCY DEPT VISIT MOD MDM: CPT

## 2019-01-01 PROCEDURE — 87040 BLOOD CULTURE FOR BACTERIA: CPT | Performed by: EMERGENCY MEDICINE

## 2019-01-01 PROCEDURE — 0B9F8ZX DRAINAGE OF RIGHT LOWER LUNG LOBE, VIA NATURAL OR ARTIFICIAL OPENING ENDOSCOPIC, DIAGNOSTIC: ICD-10-PCS | Performed by: INTERNAL MEDICINE

## 2019-01-01 PROCEDURE — 85025 COMPLETE CBC W/AUTO DIFF WBC: CPT | Performed by: FAMILY MEDICINE

## 2019-01-01 PROCEDURE — 85730 THROMBOPLASTIN TIME PARTIAL: CPT | Performed by: INTERNAL MEDICINE

## 2019-01-01 PROCEDURE — 73562 X-RAY EXAM OF KNEE 3: CPT | Performed by: ORTHOPAEDIC SURGERY

## 2019-01-01 PROCEDURE — 82962 GLUCOSE BLOOD TEST: CPT

## 2019-01-01 PROCEDURE — 85610 PROTHROMBIN TIME: CPT | Performed by: EMERGENCY MEDICINE

## 2019-01-01 PROCEDURE — 99204 OFFICE O/P NEW MOD 45 MIN: CPT | Performed by: FAMILY MEDICINE

## 2019-01-01 PROCEDURE — 25010000002 PHENYLEPHRINE PER 1 ML: Performed by: ANESTHESIOLOGY

## 2019-01-01 PROCEDURE — 80053 COMPREHEN METABOLIC PANEL: CPT | Performed by: HOSPITALIST

## 2019-01-01 PROCEDURE — 80053 COMPREHEN METABOLIC PANEL: CPT | Performed by: EMERGENCY MEDICINE

## 2019-01-01 PROCEDURE — 80048 BASIC METABOLIC PNL TOTAL CA: CPT | Performed by: HOSPITALIST

## 2019-01-01 PROCEDURE — 97110 THERAPEUTIC EXERCISES: CPT

## 2019-01-01 PROCEDURE — 93010 ELECTROCARDIOGRAM REPORT: CPT | Performed by: INTERNAL MEDICINE

## 2019-01-01 PROCEDURE — 25010000003 LIDOCAINE 1 % SOLUTION: Performed by: RADIOLOGY

## 2019-01-01 PROCEDURE — 36600 WITHDRAWAL OF ARTERIAL BLOOD: CPT

## 2019-01-01 PROCEDURE — 83880 ASSAY OF NATRIURETIC PEPTIDE: CPT | Performed by: EMERGENCY MEDICINE

## 2019-01-01 PROCEDURE — 36415 COLL VENOUS BLD VENIPUNCTURE: CPT | Performed by: FAMILY MEDICINE

## 2019-01-01 PROCEDURE — 87116 MYCOBACTERIA CULTURE: CPT | Performed by: INTERNAL MEDICINE

## 2019-01-01 PROCEDURE — 87205 SMEAR GRAM STAIN: CPT | Performed by: INTERNAL MEDICINE

## 2019-01-01 PROCEDURE — 71275 CT ANGIOGRAPHY CHEST: CPT

## 2019-01-01 PROCEDURE — 76942 ECHO GUIDE FOR BIOPSY: CPT

## 2019-01-01 PROCEDURE — 82803 BLOOD GASES ANY COMBINATION: CPT

## 2019-01-01 PROCEDURE — 0W993ZX DRAINAGE OF RIGHT PLEURAL CAVITY, PERCUTANEOUS APPROACH, DIAGNOSTIC: ICD-10-PCS | Performed by: INTERNAL MEDICINE

## 2019-01-01 PROCEDURE — 93000 ELECTROCARDIOGRAM COMPLETE: CPT | Performed by: FAMILY MEDICINE

## 2019-01-01 PROCEDURE — 88305 TISSUE EXAM BY PATHOLOGIST: CPT | Performed by: INTERNAL MEDICINE

## 2019-01-01 PROCEDURE — 99214 OFFICE O/P EST MOD 30 MIN: CPT | Performed by: FAMILY MEDICINE

## 2019-01-01 PROCEDURE — 25010000002 ONDANSETRON PER 1 MG: Performed by: EMERGENCY MEDICINE

## 2019-01-01 PROCEDURE — 25010000002 PROPOFOL 10 MG/ML EMULSION: Performed by: ANESTHESIOLOGY

## 2019-01-01 PROCEDURE — 87070 CULTURE OTHR SPECIMN AEROBIC: CPT | Performed by: INTERNAL MEDICINE

## 2019-01-01 PROCEDURE — 80061 LIPID PANEL: CPT | Performed by: FAMILY MEDICINE

## 2019-01-01 PROCEDURE — 71260 CT THORAX DX C+: CPT

## 2019-01-01 PROCEDURE — 93005 ELECTROCARDIOGRAM TRACING: CPT | Performed by: EMERGENCY MEDICINE

## 2019-01-01 PROCEDURE — 80053 COMPREHEN METABOLIC PANEL: CPT | Performed by: FAMILY MEDICINE

## 2019-01-01 PROCEDURE — 84157 ASSAY OF PROTEIN OTHER: CPT | Performed by: INTERNAL MEDICINE

## 2019-01-01 PROCEDURE — 97110 THERAPEUTIC EXERCISES: CPT | Performed by: PHYSICAL THERAPIST

## 2019-01-01 PROCEDURE — 87075 CULTR BACTERIA EXCEPT BLOOD: CPT | Performed by: INTERNAL MEDICINE

## 2019-01-01 PROCEDURE — 84145 PROCALCITONIN (PCT): CPT | Performed by: EMERGENCY MEDICINE

## 2019-01-01 PROCEDURE — 85027 COMPLETE CBC AUTOMATED: CPT | Performed by: NURSE PRACTITIONER

## 2019-01-01 PROCEDURE — 83036 HEMOGLOBIN GLYCOSYLATED A1C: CPT | Performed by: FAMILY MEDICINE

## 2019-01-01 PROCEDURE — 94640 AIRWAY INHALATION TREATMENT: CPT

## 2019-01-01 PROCEDURE — 80048 BASIC METABOLIC PNL TOTAL CA: CPT | Performed by: NURSE PRACTITIONER

## 2019-01-01 PROCEDURE — 25010000002 IOPAMIDOL 61 % SOLUTION: Performed by: EMERGENCY MEDICINE

## 2019-01-01 PROCEDURE — 85025 COMPLETE CBC W/AUTO DIFF WBC: CPT | Performed by: HOSPITALIST

## 2019-01-01 PROCEDURE — 87071 CULTURE AEROBIC QUANT OTHER: CPT | Performed by: INTERNAL MEDICINE

## 2019-01-01 PROCEDURE — 83986 ASSAY PH BODY FLUID NOS: CPT | Performed by: INTERNAL MEDICINE

## 2019-01-01 PROCEDURE — 83615 LACTATE (LD) (LDH) ENZYME: CPT | Performed by: INTERNAL MEDICINE

## 2019-01-01 PROCEDURE — 88185 FLOWCYTOMETRY/TC ADD-ON: CPT

## 2019-01-01 PROCEDURE — 99285 EMERGENCY DEPT VISIT HI MDM: CPT

## 2019-01-01 PROCEDURE — 97530 THERAPEUTIC ACTIVITIES: CPT

## 2019-01-01 PROCEDURE — 82945 GLUCOSE OTHER FLUID: CPT | Performed by: INTERNAL MEDICINE

## 2019-01-01 PROCEDURE — 71045 X-RAY EXAM CHEST 1 VIEW: CPT

## 2019-01-01 PROCEDURE — 87206 SMEAR FLUORESCENT/ACID STAI: CPT | Performed by: INTERNAL MEDICINE

## 2019-01-01 PROCEDURE — 93306 TTE W/DOPPLER COMPLETE: CPT

## 2019-01-01 PROCEDURE — 87102 FUNGUS ISOLATION CULTURE: CPT | Performed by: INTERNAL MEDICINE

## 2019-01-01 PROCEDURE — 87015 SPECIMEN INFECT AGNT CONCNTJ: CPT | Performed by: INTERNAL MEDICINE

## 2019-01-01 PROCEDURE — 83605 ASSAY OF LACTIC ACID: CPT | Performed by: EMERGENCY MEDICINE

## 2019-01-01 PROCEDURE — 93306 TTE W/DOPPLER COMPLETE: CPT | Performed by: INTERNAL MEDICINE

## 2019-01-01 PROCEDURE — 76000 FLUOROSCOPY <1 HR PHYS/QHP: CPT

## 2019-01-01 PROCEDURE — 88112 CYTOPATH CELL ENHANCE TECH: CPT | Performed by: INTERNAL MEDICINE

## 2019-01-01 PROCEDURE — 25010000002 PERFLUTREN (DEFINITY) 8.476 MG IN SODIUM CHLORIDE (PF) 0.9 % 10 ML INJECTION: Performed by: NURSE PRACTITIONER

## 2019-01-01 PROCEDURE — 93970 EXTREMITY STUDY: CPT

## 2019-01-01 PROCEDURE — 97162 PT EVAL MOD COMPLEX 30 MIN: CPT

## 2019-01-01 PROCEDURE — PTNOCHG PR CUSTOM PT NO CHARGE VISIT: Performed by: PHYSICAL THERAPIST

## 2019-01-01 RX ORDER — LIDOCAINE HYDROCHLORIDE 20 MG/ML
INJECTION, SOLUTION EPIDURAL; INFILTRATION; INTRACAUDAL; PERINEURAL AS NEEDED
Status: DISCONTINUED | OUTPATIENT
Start: 2019-01-01 | End: 2019-01-01 | Stop reason: HOSPADM

## 2019-01-01 RX ORDER — PROPOFOL 10 MG/ML
VIAL (ML) INTRAVENOUS AS NEEDED
Status: DISCONTINUED | OUTPATIENT
Start: 2019-01-01 | End: 2019-01-01 | Stop reason: SURG

## 2019-01-01 RX ORDER — NICOTINE POLACRILEX 4 MG
15 LOZENGE BUCCAL
Status: DISCONTINUED | OUTPATIENT
Start: 2019-01-01 | End: 2019-01-01 | Stop reason: HOSPADM

## 2019-01-01 RX ORDER — LIDOCAINE HYDROCHLORIDE 10 MG/ML
10 INJECTION, SOLUTION INFILTRATION; PERINEURAL ONCE
Status: COMPLETED | OUTPATIENT
Start: 2019-01-01 | End: 2019-01-01

## 2019-01-01 RX ORDER — PANTOPRAZOLE SODIUM 40 MG/1
40 TABLET, DELAYED RELEASE ORAL DAILY
Qty: 90 TABLET | Refills: 1 | Status: SHIPPED | OUTPATIENT
Start: 2019-01-01

## 2019-01-01 RX ORDER — INSULIN GLARGINE 100 [IU]/ML
15 INJECTION, SOLUTION SUBCUTANEOUS NIGHTLY
Status: DISCONTINUED | OUTPATIENT
Start: 2019-01-01 | End: 2019-01-01 | Stop reason: HOSPADM

## 2019-01-01 RX ORDER — INSULIN GLARGINE 100 [IU]/ML
25 INJECTION, SOLUTION SUBCUTANEOUS NIGHTLY
Status: DISCONTINUED | OUTPATIENT
Start: 2019-01-01 | End: 2019-01-01

## 2019-01-01 RX ORDER — ALBUTEROL SULFATE 2.5 MG/3ML
2.5 SOLUTION RESPIRATORY (INHALATION) EVERY 6 HOURS PRN
Status: DISCONTINUED | OUTPATIENT
Start: 2019-01-01 | End: 2019-01-01 | Stop reason: HOSPADM

## 2019-01-01 RX ORDER — LOSARTAN POTASSIUM 50 MG/1
50 TABLET ORAL
Status: DISCONTINUED | OUTPATIENT
Start: 2019-01-01 | End: 2019-01-01 | Stop reason: HOSPADM

## 2019-01-01 RX ORDER — LEVOFLOXACIN 750 MG/1
750 TABLET ORAL
Qty: 3 TABLET | Refills: 0 | Status: SHIPPED | OUTPATIENT
Start: 2019-01-01 | End: 2019-01-01

## 2019-01-01 RX ORDER — LIDOCAINE HYDROCHLORIDE 20 MG/ML
INJECTION, SOLUTION INFILTRATION; PERINEURAL AS NEEDED
Status: DISCONTINUED | OUTPATIENT
Start: 2019-01-01 | End: 2019-01-01 | Stop reason: SURG

## 2019-01-01 RX ORDER — PANTOPRAZOLE SODIUM 40 MG/1
40 TABLET, DELAYED RELEASE ORAL DAILY
Refills: 1 | COMMUNITY
Start: 2019-01-31 | End: 2019-01-01 | Stop reason: SDUPTHER

## 2019-01-01 RX ORDER — PANTOPRAZOLE SODIUM 40 MG/1
40 TABLET, DELAYED RELEASE ORAL DAILY
Qty: 30 TABLET | Refills: 1 | Status: SHIPPED | OUTPATIENT
Start: 2019-01-01 | End: 2019-01-01 | Stop reason: SDUPTHER

## 2019-01-01 RX ORDER — SODIUM CHLORIDE 0.9 % (FLUSH) 0.9 %
10 SYRINGE (ML) INJECTION AS NEEDED
Status: DISCONTINUED | OUTPATIENT
Start: 2019-01-01 | End: 2019-01-01 | Stop reason: HOSPADM

## 2019-01-01 RX ORDER — PRAVASTATIN SODIUM 40 MG
40 TABLET ORAL
Refills: 0 | COMMUNITY
Start: 2019-01-08 | End: 2019-01-01 | Stop reason: SDUPTHER

## 2019-01-01 RX ORDER — LOSARTAN POTASSIUM 50 MG/1
50 TABLET ORAL
Qty: 30 TABLET | Refills: 0 | Status: SHIPPED | OUTPATIENT
Start: 2019-01-01 | End: 2020-01-01 | Stop reason: SDUPTHER

## 2019-01-01 RX ORDER — ONDANSETRON 2 MG/ML
4 INJECTION INTRAMUSCULAR; INTRAVENOUS ONCE AS NEEDED
Status: DISCONTINUED | OUTPATIENT
Start: 2019-01-01 | End: 2019-01-01 | Stop reason: HOSPADM

## 2019-01-01 RX ORDER — AMLODIPINE BESYLATE 10 MG/1
10 TABLET ORAL DAILY
Status: DISCONTINUED | OUTPATIENT
Start: 2019-01-01 | End: 2019-01-01 | Stop reason: HOSPADM

## 2019-01-01 RX ORDER — PROMETHAZINE HYDROCHLORIDE 25 MG/ML
6.25 INJECTION, SOLUTION INTRAMUSCULAR; INTRAVENOUS
Status: DISCONTINUED | OUTPATIENT
Start: 2019-01-01 | End: 2019-01-01 | Stop reason: HOSPADM

## 2019-01-01 RX ORDER — CITALOPRAM 20 MG/1
20 TABLET ORAL DAILY
Qty: 30 TABLET | Refills: 9 | Status: SHIPPED | OUTPATIENT
Start: 2019-01-01

## 2019-01-01 RX ORDER — CITALOPRAM 20 MG/1
20 TABLET ORAL DAILY
COMMUNITY
End: 2019-01-01 | Stop reason: SDUPTHER

## 2019-01-01 RX ORDER — LOSARTAN POTASSIUM AND HYDROCHLOROTHIAZIDE 12.5; 5 MG/1; MG/1
1 TABLET ORAL DAILY
Qty: 30 TABLET | Refills: 6 | Status: SHIPPED | OUTPATIENT
Start: 2019-01-01 | End: 2019-01-01 | Stop reason: HOSPADM

## 2019-01-01 RX ORDER — LIDOCAINE HYDROCHLORIDE 10 MG/ML
INJECTION, SOLUTION EPIDURAL; INFILTRATION; INTRACAUDAL; PERINEURAL AS NEEDED
Status: DISCONTINUED | OUTPATIENT
Start: 2019-01-01 | End: 2019-01-01 | Stop reason: HOSPADM

## 2019-01-01 RX ORDER — TAMSULOSIN HYDROCHLORIDE 0.4 MG/1
CAPSULE ORAL
Qty: 30 CAPSULE | Refills: 0 | Status: SHIPPED | OUTPATIENT
Start: 2019-01-01 | End: 2019-01-01 | Stop reason: SDUPTHER

## 2019-01-01 RX ORDER — INSULIN DETEMIR 100 [IU]/ML
INJECTION, SOLUTION SUBCUTANEOUS
Qty: 15 ML | Refills: 0 | Status: SHIPPED | OUTPATIENT
Start: 2019-01-01 | End: 2019-01-01 | Stop reason: SDUPTHER

## 2019-01-01 RX ORDER — LOSARTAN POTASSIUM AND HYDROCHLOROTHIAZIDE 12.5; 5 MG/1; MG/1
1 TABLET ORAL DAILY
COMMUNITY
Start: 2018-11-27 | End: 2019-01-01 | Stop reason: SDUPTHER

## 2019-01-01 RX ORDER — PREDNISONE 20 MG/1
40 TABLET ORAL DAILY
Qty: 10 TABLET | Refills: 0 | Status: SHIPPED | OUTPATIENT
Start: 2019-01-01 | End: 2019-01-01 | Stop reason: HOSPADM

## 2019-01-01 RX ORDER — IPRATROPIUM BROMIDE AND ALBUTEROL SULFATE 2.5; .5 MG/3ML; MG/3ML
3 SOLUTION RESPIRATORY (INHALATION)
Status: DISCONTINUED | OUTPATIENT
Start: 2019-01-01 | End: 2019-01-01 | Stop reason: HOSPADM

## 2019-01-01 RX ORDER — FUROSEMIDE 20 MG/1
20 TABLET ORAL DAILY
Status: DISCONTINUED | OUTPATIENT
Start: 2019-01-01 | End: 2019-01-01 | Stop reason: HOSPADM

## 2019-01-01 RX ORDER — INSULIN ASPART 100 [IU]/ML
INJECTION, SOLUTION INTRAVENOUS; SUBCUTANEOUS
Refills: 0 | COMMUNITY
Start: 2019-01-16 | End: 2019-01-01 | Stop reason: SDUPTHER

## 2019-01-01 RX ORDER — DIPHENHYDRAMINE HYDROCHLORIDE 50 MG/ML
12.5 INJECTION INTRAMUSCULAR; INTRAVENOUS
Status: DISCONTINUED | OUTPATIENT
Start: 2019-01-01 | End: 2019-01-01 | Stop reason: HOSPADM

## 2019-01-01 RX ORDER — TAMSULOSIN HYDROCHLORIDE 0.4 MG/1
1 CAPSULE ORAL DAILY
Qty: 30 CAPSULE | Refills: 0 | Status: SHIPPED | OUTPATIENT
Start: 2019-01-01 | End: 2019-01-01 | Stop reason: SDUPTHER

## 2019-01-01 RX ORDER — ACETAMINOPHEN 650 MG/1
650 SUPPOSITORY RECTAL ONCE AS NEEDED
Status: DISCONTINUED | OUTPATIENT
Start: 2019-01-01 | End: 2019-01-01 | Stop reason: HOSPADM

## 2019-01-01 RX ORDER — LEVOFLOXACIN 750 MG/1
750 TABLET ORAL ONCE
Status: COMPLETED | OUTPATIENT
Start: 2019-01-01 | End: 2019-01-01

## 2019-01-01 RX ORDER — SODIUM CHLORIDE, SODIUM LACTATE, POTASSIUM CHLORIDE, CALCIUM CHLORIDE 600; 310; 30; 20 MG/100ML; MG/100ML; MG/100ML; MG/100ML
INJECTION, SOLUTION INTRAVENOUS CONTINUOUS PRN
Status: DISCONTINUED | OUTPATIENT
Start: 2019-01-01 | End: 2019-01-01 | Stop reason: SURG

## 2019-01-01 RX ORDER — TAMSULOSIN HYDROCHLORIDE 0.4 MG/1
1 CAPSULE ORAL DAILY
Refills: 0 | COMMUNITY
Start: 2019-01-08 | End: 2019-01-01 | Stop reason: SDUPTHER

## 2019-01-01 RX ORDER — SODIUM CHLORIDE 9 MG/ML
30 INJECTION, SOLUTION INTRAVENOUS CONTINUOUS PRN
Status: DISCONTINUED | OUTPATIENT
Start: 2019-01-01 | End: 2019-01-01 | Stop reason: HOSPADM

## 2019-01-01 RX ORDER — ASPIRIN 81 MG/1
81 TABLET ORAL DAILY
Status: DISCONTINUED | OUTPATIENT
Start: 2019-01-01 | End: 2019-01-01

## 2019-01-01 RX ORDER — PRAVASTATIN SODIUM 40 MG
40 TABLET ORAL NIGHTLY
Status: DISCONTINUED | OUTPATIENT
Start: 2019-01-01 | End: 2019-01-01 | Stop reason: HOSPADM

## 2019-01-01 RX ORDER — FUROSEMIDE 20 MG/1
20 TABLET ORAL DAILY
Qty: 30 TABLET | Refills: 0 | Status: SHIPPED | OUTPATIENT
Start: 2019-01-01 | End: 2020-01-01 | Stop reason: SDUPTHER

## 2019-01-01 RX ORDER — OXYCODONE AND ACETAMINOPHEN 7.5; 325 MG/1; MG/1
1 TABLET ORAL ONCE AS NEEDED
Status: DISCONTINUED | OUTPATIENT
Start: 2019-01-01 | End: 2019-01-01 | Stop reason: HOSPADM

## 2019-01-01 RX ORDER — ACETAMINOPHEN 325 MG/1
650 TABLET ORAL ONCE AS NEEDED
Status: DISCONTINUED | OUTPATIENT
Start: 2019-01-01 | End: 2019-01-01 | Stop reason: HOSPADM

## 2019-01-01 RX ORDER — PANTOPRAZOLE SODIUM 40 MG/1
40 TABLET, DELAYED RELEASE ORAL EVERY MORNING
Status: DISCONTINUED | OUTPATIENT
Start: 2019-01-01 | End: 2019-01-01 | Stop reason: HOSPADM

## 2019-01-01 RX ORDER — ASPIRIN 81 MG/1
81 TABLET ORAL DAILY
COMMUNITY

## 2019-01-01 RX ORDER — PROMETHAZINE HYDROCHLORIDE 25 MG/1
25 SUPPOSITORY RECTAL ONCE AS NEEDED
Status: DISCONTINUED | OUTPATIENT
Start: 2019-01-01 | End: 2019-01-01 | Stop reason: HOSPADM

## 2019-01-01 RX ORDER — PROMETHAZINE HYDROCHLORIDE 25 MG/ML
12.5 INJECTION, SOLUTION INTRAMUSCULAR; INTRAVENOUS ONCE AS NEEDED
Status: DISCONTINUED | OUTPATIENT
Start: 2019-01-01 | End: 2019-01-01 | Stop reason: HOSPADM

## 2019-01-01 RX ORDER — AMLODIPINE BESYLATE 10 MG/1
10 TABLET ORAL DAILY
COMMUNITY
End: 2019-01-01 | Stop reason: SDUPTHER

## 2019-01-01 RX ORDER — FLUMAZENIL 0.1 MG/ML
0.2 INJECTION INTRAVENOUS AS NEEDED
Status: DISCONTINUED | OUTPATIENT
Start: 2019-01-01 | End: 2019-01-01 | Stop reason: HOSPADM

## 2019-01-01 RX ORDER — LIDOCAINE HYDROCHLORIDE 10 MG/ML
20 INJECTION, SOLUTION INFILTRATION; PERINEURAL ONCE
Status: COMPLETED | OUTPATIENT
Start: 2019-01-01 | End: 2019-01-01

## 2019-01-01 RX ORDER — HYDRALAZINE HYDROCHLORIDE 20 MG/ML
5 INJECTION INTRAMUSCULAR; INTRAVENOUS
Status: DISCONTINUED | OUTPATIENT
Start: 2019-01-01 | End: 2019-01-01 | Stop reason: HOSPADM

## 2019-01-01 RX ORDER — ACETAMINOPHEN 325 MG/1
650 TABLET ORAL EVERY 4 HOURS PRN
Status: DISCONTINUED | OUTPATIENT
Start: 2019-01-01 | End: 2019-01-01 | Stop reason: HOSPADM

## 2019-01-01 RX ORDER — AMLODIPINE BESYLATE 10 MG/1
10 TABLET ORAL DAILY
Qty: 30 TABLET | Refills: 6 | Status: ON HOLD | OUTPATIENT
Start: 2019-01-01 | End: 2019-01-01 | Stop reason: SDUPTHER

## 2019-01-01 RX ORDER — TAMSULOSIN HYDROCHLORIDE 0.4 MG/1
0.4 CAPSULE ORAL DAILY
Status: DISCONTINUED | OUTPATIENT
Start: 2019-01-01 | End: 2019-01-01 | Stop reason: HOSPADM

## 2019-01-01 RX ORDER — PIOGLITAZONEHYDROCHLORIDE 30 MG/1
30 TABLET ORAL DAILY
COMMUNITY
End: 2019-01-01 | Stop reason: SDUPTHER

## 2019-01-01 RX ORDER — HYDROCODONE BITARTRATE AND ACETAMINOPHEN 7.5; 325 MG/1; MG/1
1 TABLET ORAL ONCE AS NEEDED
Status: DISCONTINUED | OUTPATIENT
Start: 2019-01-01 | End: 2019-01-01 | Stop reason: HOSPADM

## 2019-01-01 RX ORDER — INSULIN ASPART 100 [IU]/ML
INJECTION, SOLUTION INTRAVENOUS; SUBCUTANEOUS
Qty: 15 ML | Refills: 6 | Status: SHIPPED | OUTPATIENT
Start: 2019-01-01 | End: 2019-01-01 | Stop reason: SDUPTHER

## 2019-01-01 RX ORDER — DEXTROSE MONOHYDRATE 25 G/50ML
25 INJECTION, SOLUTION INTRAVENOUS
Status: DISCONTINUED | OUTPATIENT
Start: 2019-01-01 | End: 2019-01-01 | Stop reason: HOSPADM

## 2019-01-01 RX ORDER — ONDANSETRON 4 MG/1
4 TABLET, FILM COATED ORAL EVERY 6 HOURS PRN
Status: DISCONTINUED | OUTPATIENT
Start: 2019-01-01 | End: 2019-01-01 | Stop reason: HOSPADM

## 2019-01-01 RX ORDER — FENOFIBRATE 160 MG/1
160 TABLET ORAL DAILY
Qty: 30 TABLET | Refills: 6 | Status: SHIPPED | OUTPATIENT
Start: 2019-01-01 | End: 2019-01-01

## 2019-01-01 RX ORDER — PANTOPRAZOLE SODIUM 40 MG/1
40 TABLET, DELAYED RELEASE ORAL DAILY
Qty: 90 TABLET | Refills: 1 | Status: SHIPPED | OUTPATIENT
Start: 2019-01-01 | End: 2019-01-01 | Stop reason: SDUPTHER

## 2019-01-01 RX ORDER — PRAVASTATIN SODIUM 40 MG
40 TABLET ORAL DAILY
Status: DISCONTINUED | OUTPATIENT
Start: 2019-01-01 | End: 2019-01-01

## 2019-01-01 RX ORDER — MELATONIN
1000 DAILY
COMMUNITY

## 2019-01-01 RX ORDER — DOCUSATE SODIUM 100 MG/1
100 CAPSULE, LIQUID FILLED ORAL 2 TIMES DAILY
Status: DISCONTINUED | OUTPATIENT
Start: 2019-01-01 | End: 2019-01-01 | Stop reason: HOSPADM

## 2019-01-01 RX ORDER — NALOXONE HCL 0.4 MG/ML
0.2 VIAL (ML) INJECTION AS NEEDED
Status: DISCONTINUED | OUTPATIENT
Start: 2019-01-01 | End: 2019-01-01 | Stop reason: HOSPADM

## 2019-01-01 RX ORDER — FUROSEMIDE 40 MG/1
40 TABLET ORAL DAILY
Status: DISCONTINUED | OUTPATIENT
Start: 2019-01-01 | End: 2019-01-01

## 2019-01-01 RX ORDER — FENTANYL CITRATE 50 UG/ML
50 INJECTION, SOLUTION INTRAMUSCULAR; INTRAVENOUS
Status: DISCONTINUED | OUTPATIENT
Start: 2019-01-01 | End: 2019-01-01 | Stop reason: HOSPADM

## 2019-01-01 RX ORDER — ACETAMINOPHEN 160 MG/5ML
650 SOLUTION ORAL EVERY 4 HOURS PRN
Status: DISCONTINUED | OUTPATIENT
Start: 2019-01-01 | End: 2019-01-01 | Stop reason: HOSPADM

## 2019-01-01 RX ORDER — INSULIN ASPART 100 [IU]/ML
INJECTION, SOLUTION INTRAVENOUS; SUBCUTANEOUS
Qty: 15 ML | Refills: 0 | Status: SHIPPED | OUTPATIENT
Start: 2019-01-01 | End: 2019-01-01 | Stop reason: SDUPTHER

## 2019-01-01 RX ORDER — PROMETHAZINE HYDROCHLORIDE 25 MG/1
25 TABLET ORAL ONCE AS NEEDED
Status: DISCONTINUED | OUTPATIENT
Start: 2019-01-01 | End: 2019-01-01 | Stop reason: HOSPADM

## 2019-01-01 RX ORDER — FENOFIBRATE 160 MG/1
160 TABLET ORAL DAILY
COMMUNITY
End: 2019-01-01 | Stop reason: SDUPTHER

## 2019-01-01 RX ORDER — PIOGLITAZONEHYDROCHLORIDE 30 MG/1
30 TABLET ORAL DAILY
Qty: 30 TABLET | Refills: 6 | Status: SHIPPED | OUTPATIENT
Start: 2019-01-01 | End: 2019-01-01 | Stop reason: HOSPADM

## 2019-01-01 RX ORDER — LABETALOL HYDROCHLORIDE 5 MG/ML
5 INJECTION, SOLUTION INTRAVENOUS
Status: DISCONTINUED | OUTPATIENT
Start: 2019-01-01 | End: 2019-01-01 | Stop reason: HOSPADM

## 2019-01-01 RX ORDER — ACETAMINOPHEN 650 MG/1
650 SUPPOSITORY RECTAL EVERY 4 HOURS PRN
Status: DISCONTINUED | OUTPATIENT
Start: 2019-01-01 | End: 2019-01-01 | Stop reason: HOSPADM

## 2019-01-01 RX ORDER — CITALOPRAM 20 MG/1
20 TABLET ORAL DAILY
Status: DISCONTINUED | OUTPATIENT
Start: 2019-01-01 | End: 2019-01-01 | Stop reason: HOSPADM

## 2019-01-01 RX ORDER — TAMSULOSIN HYDROCHLORIDE 0.4 MG/1
CAPSULE ORAL
Qty: 30 CAPSULE | Refills: 0 | Status: SHIPPED | OUTPATIENT
Start: 2019-01-01 | End: 2020-01-01

## 2019-01-01 RX ORDER — ONDANSETRON 2 MG/ML
4 INJECTION INTRAMUSCULAR; INTRAVENOUS ONCE
Status: COMPLETED | OUTPATIENT
Start: 2019-01-01 | End: 2019-01-01

## 2019-01-01 RX ORDER — EPHEDRINE SULFATE 50 MG/ML
5 INJECTION, SOLUTION INTRAVENOUS ONCE AS NEEDED
Status: DISCONTINUED | OUTPATIENT
Start: 2019-01-01 | End: 2019-01-01 | Stop reason: HOSPADM

## 2019-01-01 RX ORDER — HYDROMORPHONE HCL 110MG/55ML
0.5 PATIENT CONTROLLED ANALGESIA SYRINGE INTRAVENOUS
Status: DISCONTINUED | OUTPATIENT
Start: 2019-01-01 | End: 2019-01-01 | Stop reason: HOSPADM

## 2019-01-01 RX ORDER — INSULIN ASPART 100 [IU]/ML
INJECTION, SOLUTION INTRAVENOUS; SUBCUTANEOUS
Qty: 15 ML | Refills: 0 | Status: SHIPPED | OUTPATIENT
Start: 2019-01-01 | End: 2019-01-01 | Stop reason: HOSPADM

## 2019-01-01 RX ORDER — NITROGLYCERIN 0.4 MG/1
0.4 TABLET SUBLINGUAL
Status: DISCONTINUED | OUTPATIENT
Start: 2019-01-01 | End: 2019-01-01 | Stop reason: HOSPADM

## 2019-01-01 RX ORDER — LOSARTAN POTASSIUM AND HYDROCHLOROTHIAZIDE 12.5; 5 MG/1; MG/1
1 TABLET ORAL DAILY
Qty: 30 TABLET | Refills: 6 | Status: SHIPPED | OUTPATIENT
Start: 2019-01-01 | End: 2019-01-01 | Stop reason: SDUPTHER

## 2019-01-01 RX ORDER — PROPOFOL 10 MG/ML
VIAL (ML) INTRAVENOUS CONTINUOUS PRN
Status: DISCONTINUED | OUTPATIENT
Start: 2019-01-01 | End: 2019-01-01 | Stop reason: SURG

## 2019-01-01 RX ORDER — DIPHENHYDRAMINE HCL 25 MG
25 CAPSULE ORAL
Status: DISCONTINUED | OUTPATIENT
Start: 2019-01-01 | End: 2019-01-01 | Stop reason: HOSPADM

## 2019-01-01 RX ORDER — PRAVASTATIN SODIUM 40 MG
40 TABLET ORAL
Qty: 30 TABLET | Refills: 6 | Status: SHIPPED | OUTPATIENT
Start: 2019-01-01 | End: 2020-01-01

## 2019-01-01 RX ORDER — AMLODIPINE BESYLATE 10 MG/1
5 TABLET ORAL DAILY
Qty: 30 TABLET | Refills: 0 | Status: SHIPPED | OUTPATIENT
Start: 2019-01-01 | End: 2020-01-01 | Stop reason: SDUPTHER

## 2019-01-01 RX ORDER — SODIUM CHLORIDE 0.9 % (FLUSH) 0.9 %
10 SYRINGE (ML) INJECTION EVERY 12 HOURS SCHEDULED
Status: DISCONTINUED | OUTPATIENT
Start: 2019-01-01 | End: 2019-01-01 | Stop reason: HOSPADM

## 2019-01-01 RX ORDER — AMLODIPINE BESYLATE 10 MG/1
10 TABLET ORAL DAILY
Qty: 30 TABLET | Refills: 6 | Status: SHIPPED | OUTPATIENT
Start: 2019-01-01 | End: 2019-01-01 | Stop reason: SDUPTHER

## 2019-01-01 RX ORDER — ASPIRIN 81 MG/1
324 TABLET, CHEWABLE ORAL ONCE
Status: COMPLETED | OUTPATIENT
Start: 2019-01-01 | End: 2019-01-01

## 2019-01-01 RX ORDER — ALBUTEROL SULFATE 90 UG/1
2 AEROSOL, METERED RESPIRATORY (INHALATION) EVERY 4 HOURS PRN
Qty: 1 INHALER | Refills: 11 | Status: SHIPPED | OUTPATIENT
Start: 2019-01-01

## 2019-01-01 RX ADMIN — IPRATROPIUM BROMIDE AND ALBUTEROL SULFATE 3 ML: 2.5; .5 SOLUTION RESPIRATORY (INHALATION) at 17:14

## 2019-01-01 RX ADMIN — ASPIRIN 324 MG: 81 TABLET, CHEWABLE ORAL at 11:44

## 2019-01-01 RX ADMIN — TAMSULOSIN HYDROCHLORIDE 0.4 MG: 0.4 CAPSULE ORAL at 08:03

## 2019-01-01 RX ADMIN — LIDOCAINE HYDROCHLORIDE 20 ML: 10 INJECTION, SOLUTION INFILTRATION; PERINEURAL at 10:53

## 2019-01-01 RX ADMIN — AMLODIPINE BESYLATE 10 MG: 10 TABLET ORAL at 08:00

## 2019-01-01 RX ADMIN — FUROSEMIDE 20 MG: 20 TABLET ORAL at 08:25

## 2019-01-01 RX ADMIN — AMLODIPINE BESYLATE 10 MG: 10 TABLET ORAL at 09:17

## 2019-01-01 RX ADMIN — DOCUSATE SODIUM 100 MG: 100 CAPSULE, LIQUID FILLED ORAL at 21:19

## 2019-01-01 RX ADMIN — IOPAMIDOL 100 ML: 612 INJECTION, SOLUTION INTRAVENOUS at 12:22

## 2019-01-01 RX ADMIN — SODIUM CHLORIDE, PRESERVATIVE FREE 10 ML: 5 INJECTION INTRAVENOUS at 07:59

## 2019-01-01 RX ADMIN — INSULIN LISPRO 3 UNITS: 100 INJECTION, SOLUTION INTRAVENOUS; SUBCUTANEOUS at 12:11

## 2019-01-01 RX ADMIN — IPRATROPIUM BROMIDE AND ALBUTEROL SULFATE 3 ML: 2.5; .5 SOLUTION RESPIRATORY (INHALATION) at 07:26

## 2019-01-01 RX ADMIN — LOSARTAN POTASSIUM 50 MG: 50 TABLET, FILM COATED ORAL at 08:44

## 2019-01-01 RX ADMIN — INSULIN LISPRO 8 UNITS: 100 INJECTION, SOLUTION INTRAVENOUS; SUBCUTANEOUS at 21:21

## 2019-01-01 RX ADMIN — LEVOFLOXACIN 750 MG: 750 TABLET, FILM COATED ORAL at 21:18

## 2019-01-01 RX ADMIN — SODIUM CHLORIDE, PRESERVATIVE FREE 10 ML: 5 INJECTION INTRAVENOUS at 21:05

## 2019-01-01 RX ADMIN — METOPROLOL TARTRATE 25 MG: 25 TABLET ORAL at 21:05

## 2019-01-01 RX ADMIN — IPRATROPIUM BROMIDE AND ALBUTEROL SULFATE 3 ML: 2.5; .5 SOLUTION RESPIRATORY (INHALATION) at 15:57

## 2019-01-01 RX ADMIN — CITALOPRAM 20 MG: 20 TABLET, FILM COATED ORAL at 10:55

## 2019-01-01 RX ADMIN — FUROSEMIDE 20 MG: 20 TABLET ORAL at 10:55

## 2019-01-01 RX ADMIN — IPRATROPIUM BROMIDE AND ALBUTEROL SULFATE 3 ML: 2.5; .5 SOLUTION RESPIRATORY (INHALATION) at 19:19

## 2019-01-01 RX ADMIN — SODIUM CHLORIDE, PRESERVATIVE FREE 10 ML: 5 INJECTION INTRAVENOUS at 20:50

## 2019-01-01 RX ADMIN — METOPROLOL TARTRATE 25 MG: 25 TABLET ORAL at 08:44

## 2019-01-01 RX ADMIN — IPRATROPIUM BROMIDE AND ALBUTEROL SULFATE 3 ML: 2.5; .5 SOLUTION RESPIRATORY (INHALATION) at 11:23

## 2019-01-01 RX ADMIN — INSULIN GLARGINE 15 UNITS: 100 INJECTION, SOLUTION SUBCUTANEOUS at 21:17

## 2019-01-01 RX ADMIN — INSULIN LISPRO 3 UNITS: 100 INJECTION, SOLUTION INTRAVENOUS; SUBCUTANEOUS at 08:25

## 2019-01-01 RX ADMIN — PRAVASTATIN SODIUM 40 MG: 40 TABLET ORAL at 20:49

## 2019-01-01 RX ADMIN — ASPIRIN 81 MG: 81 TABLET, COATED ORAL at 08:00

## 2019-01-01 RX ADMIN — SODIUM CHLORIDE, POTASSIUM CHLORIDE, SODIUM LACTATE AND CALCIUM CHLORIDE: 600; 310; 30; 20 INJECTION, SOLUTION INTRAVENOUS at 08:17

## 2019-01-01 RX ADMIN — FUROSEMIDE 40 MG: 40 TABLET ORAL at 18:23

## 2019-01-01 RX ADMIN — AMLODIPINE BESYLATE 10 MG: 10 TABLET ORAL at 08:44

## 2019-01-01 RX ADMIN — LOSARTAN POTASSIUM 50 MG: 50 TABLET, FILM COATED ORAL at 10:55

## 2019-01-01 RX ADMIN — LIDOCAINE HYDROCHLORIDE 60 MG: 20 INJECTION, SOLUTION INFILTRATION; PERINEURAL at 08:23

## 2019-01-01 RX ADMIN — PANTOPRAZOLE SODIUM 40 MG: 40 TABLET, DELAYED RELEASE ORAL at 08:00

## 2019-01-01 RX ADMIN — DOCUSATE SODIUM 100 MG: 100 CAPSULE, LIQUID FILLED ORAL at 21:05

## 2019-01-01 RX ADMIN — PROPOFOL 200 MG: 10 INJECTION, EMULSION INTRAVENOUS at 08:23

## 2019-01-01 RX ADMIN — DOCUSATE SODIUM 100 MG: 100 CAPSULE, LIQUID FILLED ORAL at 08:44

## 2019-01-01 RX ADMIN — DOCUSATE SODIUM 100 MG: 100 CAPSULE, LIQUID FILLED ORAL at 10:55

## 2019-01-01 RX ADMIN — SODIUM CHLORIDE, PRESERVATIVE FREE 10 ML: 5 INJECTION INTRAVENOUS at 08:45

## 2019-01-01 RX ADMIN — IOPAMIDOL 95 ML: 755 INJECTION, SOLUTION INTRAVENOUS at 19:22

## 2019-01-01 RX ADMIN — PANTOPRAZOLE SODIUM 40 MG: 40 TABLET, DELAYED RELEASE ORAL at 06:10

## 2019-01-01 RX ADMIN — AMLODIPINE BESYLATE 10 MG: 10 TABLET ORAL at 08:25

## 2019-01-01 RX ADMIN — TAMSULOSIN HYDROCHLORIDE 0.4 MG: 0.4 CAPSULE ORAL at 10:55

## 2019-01-01 RX ADMIN — IPRATROPIUM BROMIDE AND ALBUTEROL SULFATE 3 ML: 2.5; .5 SOLUTION RESPIRATORY (INHALATION) at 15:53

## 2019-01-01 RX ADMIN — PHENYLEPHRINE HYDROCHLORIDE 100 MCG: 10 INJECTION INTRAVENOUS at 08:40

## 2019-01-01 RX ADMIN — METOPROLOL TARTRATE 25 MG: 25 TABLET ORAL at 08:25

## 2019-01-01 RX ADMIN — CITALOPRAM 20 MG: 20 TABLET, FILM COATED ORAL at 08:03

## 2019-01-01 RX ADMIN — CITALOPRAM 20 MG: 20 TABLET, FILM COATED ORAL at 08:25

## 2019-01-01 RX ADMIN — SODIUM CHLORIDE 30 ML/HR: 9 INJECTION, SOLUTION INTRAVENOUS at 08:10

## 2019-01-01 RX ADMIN — INSULIN GLARGINE 15 UNITS: 100 INJECTION, SOLUTION SUBCUTANEOUS at 21:00

## 2019-01-01 RX ADMIN — DOCUSATE SODIUM 100 MG: 100 CAPSULE, LIQUID FILLED ORAL at 21:46

## 2019-01-01 RX ADMIN — FUROSEMIDE 40 MG: 40 TABLET ORAL at 09:17

## 2019-01-01 RX ADMIN — TAMSULOSIN HYDROCHLORIDE 0.4 MG: 0.4 CAPSULE ORAL at 09:17

## 2019-01-01 RX ADMIN — METOPROLOL TARTRATE 25 MG: 25 TABLET ORAL at 08:00

## 2019-01-01 RX ADMIN — AMLODIPINE BESYLATE 10 MG: 10 TABLET ORAL at 18:23

## 2019-01-01 RX ADMIN — FUROSEMIDE 40 MG: 40 TABLET ORAL at 08:44

## 2019-01-01 RX ADMIN — DOCUSATE SODIUM 100 MG: 100 CAPSULE, LIQUID FILLED ORAL at 20:08

## 2019-01-01 RX ADMIN — IPRATROPIUM BROMIDE AND ALBUTEROL SULFATE 3 ML: 2.5; .5 SOLUTION RESPIRATORY (INHALATION) at 07:04

## 2019-01-01 RX ADMIN — SODIUM CHLORIDE, PRESERVATIVE FREE 10 ML: 5 INJECTION INTRAVENOUS at 10:56

## 2019-01-01 RX ADMIN — ASPIRIN 81 MG: 81 TABLET, COATED ORAL at 08:44

## 2019-01-01 RX ADMIN — LOSARTAN POTASSIUM 50 MG: 50 TABLET, FILM COATED ORAL at 09:17

## 2019-01-01 RX ADMIN — LOSARTAN POTASSIUM 50 MG: 50 TABLET, FILM COATED ORAL at 08:03

## 2019-01-01 RX ADMIN — TAMSULOSIN HYDROCHLORIDE 0.4 MG: 0.4 CAPSULE ORAL at 08:44

## 2019-01-01 RX ADMIN — PRAVASTATIN SODIUM 40 MG: 40 TABLET ORAL at 21:05

## 2019-01-01 RX ADMIN — PRAVASTATIN SODIUM 40 MG: 40 TABLET ORAL at 21:32

## 2019-01-01 RX ADMIN — SODIUM CHLORIDE, PRESERVATIVE FREE 10 ML: 5 INJECTION INTRAVENOUS at 21:32

## 2019-01-01 RX ADMIN — IPRATROPIUM BROMIDE AND ALBUTEROL SULFATE 3 ML: 2.5; .5 SOLUTION RESPIRATORY (INHALATION) at 08:42

## 2019-01-01 RX ADMIN — METOPROLOL TARTRATE 25 MG: 25 TABLET ORAL at 20:49

## 2019-01-01 RX ADMIN — LOSARTAN POTASSIUM 50 MG: 50 TABLET, FILM COATED ORAL at 08:00

## 2019-01-01 RX ADMIN — PRAVASTATIN SODIUM 40 MG: 40 TABLET ORAL at 20:08

## 2019-01-01 RX ADMIN — IPRATROPIUM BROMIDE AND ALBUTEROL SULFATE 3 ML: 2.5; .5 SOLUTION RESPIRATORY (INHALATION) at 19:58

## 2019-01-01 RX ADMIN — ASPIRIN 81 MG: 81 TABLET, COATED ORAL at 09:17

## 2019-01-01 RX ADMIN — METOPROLOL TARTRATE 25 MG: 25 TABLET ORAL at 10:55

## 2019-01-01 RX ADMIN — INSULIN GLARGINE 15 UNITS: 100 INJECTION, SOLUTION SUBCUTANEOUS at 21:32

## 2019-01-01 RX ADMIN — DOCUSATE SODIUM 100 MG: 100 CAPSULE, LIQUID FILLED ORAL at 08:00

## 2019-01-01 RX ADMIN — INSULIN LISPRO 3 UNITS: 100 INJECTION, SOLUTION INTRAVENOUS; SUBCUTANEOUS at 17:02

## 2019-01-01 RX ADMIN — INSULIN LISPRO 3 UNITS: 100 INJECTION, SOLUTION INTRAVENOUS; SUBCUTANEOUS at 12:39

## 2019-01-01 RX ADMIN — ASPIRIN 81 MG: 81 TABLET, COATED ORAL at 08:25

## 2019-01-01 RX ADMIN — AMLODIPINE BESYLATE 10 MG: 10 TABLET ORAL at 10:52

## 2019-01-01 RX ADMIN — METOPROLOL TARTRATE 25 MG: 25 TABLET ORAL at 21:32

## 2019-01-01 RX ADMIN — IPRATROPIUM BROMIDE AND ALBUTEROL SULFATE 3 ML: 2.5; .5 SOLUTION RESPIRATORY (INHALATION) at 16:27

## 2019-01-01 RX ADMIN — CITALOPRAM 20 MG: 20 TABLET, FILM COATED ORAL at 08:44

## 2019-01-01 RX ADMIN — METOPROLOL TARTRATE 25 MG: 25 TABLET ORAL at 21:46

## 2019-01-01 RX ADMIN — IPRATROPIUM BROMIDE AND ALBUTEROL SULFATE 3 ML: 2.5; .5 SOLUTION RESPIRATORY (INHALATION) at 11:09

## 2019-01-01 RX ADMIN — SODIUM CHLORIDE, PRESERVATIVE FREE 10 ML: 5 INJECTION INTRAVENOUS at 21:47

## 2019-01-01 RX ADMIN — DOCUSATE SODIUM 100 MG: 100 CAPSULE, LIQUID FILLED ORAL at 09:17

## 2019-01-01 RX ADMIN — INSULIN GLARGINE 15 UNITS: 100 INJECTION, SOLUTION SUBCUTANEOUS at 21:46

## 2019-01-01 RX ADMIN — ACETAMINOPHEN 650 MG: 325 TABLET, FILM COATED ORAL at 18:24

## 2019-01-01 RX ADMIN — SODIUM CHLORIDE, PRESERVATIVE FREE 10 ML: 5 INJECTION INTRAVENOUS at 09:17

## 2019-01-01 RX ADMIN — TAMSULOSIN HYDROCHLORIDE 0.4 MG: 0.4 CAPSULE ORAL at 08:25

## 2019-01-01 RX ADMIN — IPRATROPIUM BROMIDE AND ALBUTEROL SULFATE 3 ML: 2.5; .5 SOLUTION RESPIRATORY (INHALATION) at 11:41

## 2019-01-01 RX ADMIN — PANTOPRAZOLE SODIUM 40 MG: 40 TABLET, DELAYED RELEASE ORAL at 06:21

## 2019-01-01 RX ADMIN — LOSARTAN POTASSIUM 50 MG: 50 TABLET, FILM COATED ORAL at 08:25

## 2019-01-01 RX ADMIN — INSULIN LISPRO 3 UNITS: 100 INJECTION, SOLUTION INTRAVENOUS; SUBCUTANEOUS at 17:43

## 2019-01-01 RX ADMIN — PROPOFOL 250 MCG/KG/MIN: 10 INJECTION, EMULSION INTRAVENOUS at 08:34

## 2019-01-01 RX ADMIN — PHENYLEPHRINE HYDROCHLORIDE 100 MCG: 10 INJECTION INTRAVENOUS at 09:12

## 2019-01-01 RX ADMIN — IPRATROPIUM BROMIDE AND ALBUTEROL SULFATE 3 ML: 2.5; .5 SOLUTION RESPIRATORY (INHALATION) at 11:50

## 2019-01-01 RX ADMIN — LIDOCAINE HYDROCHLORIDE 5 ML: 10 INJECTION, SOLUTION INFILTRATION; PERINEURAL at 10:54

## 2019-01-01 RX ADMIN — SODIUM CHLORIDE, PRESERVATIVE FREE 10 ML: 5 INJECTION INTRAVENOUS at 08:26

## 2019-01-01 RX ADMIN — IPRATROPIUM BROMIDE AND ALBUTEROL SULFATE 3 ML: 2.5; .5 SOLUTION RESPIRATORY (INHALATION) at 19:45

## 2019-01-01 RX ADMIN — FUROSEMIDE 20 MG: 20 TABLET ORAL at 08:00

## 2019-01-01 RX ADMIN — IPRATROPIUM BROMIDE AND ALBUTEROL SULFATE 3 ML: 2.5; .5 SOLUTION RESPIRATORY (INHALATION) at 07:03

## 2019-01-01 RX ADMIN — ONDANSETRON 4 MG: 2 INJECTION INTRAMUSCULAR; INTRAVENOUS at 14:37

## 2019-01-01 RX ADMIN — DOCUSATE SODIUM 100 MG: 100 CAPSULE, LIQUID FILLED ORAL at 08:25

## 2019-01-01 RX ADMIN — PERFLUTREN 2 ML: 6.52 INJECTION, SUSPENSION INTRAVENOUS at 15:45

## 2019-01-01 RX ADMIN — CITALOPRAM 20 MG: 20 TABLET, FILM COATED ORAL at 18:24

## 2019-01-01 RX ADMIN — IPRATROPIUM BROMIDE AND ALBUTEROL SULFATE 3 ML: 2.5; .5 SOLUTION RESPIRATORY (INHALATION) at 11:28

## 2019-01-01 RX ADMIN — TAMSULOSIN HYDROCHLORIDE 0.4 MG: 0.4 CAPSULE ORAL at 18:25

## 2019-01-01 RX ADMIN — INSULIN LISPRO 3 UNITS: 100 INJECTION, SOLUTION INTRAVENOUS; SUBCUTANEOUS at 21:06

## 2019-01-01 RX ADMIN — SODIUM CHLORIDE, PRESERVATIVE FREE 10 ML: 5 INJECTION INTRAVENOUS at 08:00

## 2019-01-01 RX ADMIN — IPRATROPIUM BROMIDE AND ALBUTEROL SULFATE 3 ML: 2.5; .5 SOLUTION RESPIRATORY (INHALATION) at 18:42

## 2019-01-01 RX ADMIN — PANTOPRAZOLE SODIUM 40 MG: 40 TABLET, DELAYED RELEASE ORAL at 06:08

## 2019-01-01 RX ADMIN — METOPROLOL TARTRATE 25 MG: 25 TABLET ORAL at 09:17

## 2019-01-01 RX ADMIN — DOCUSATE SODIUM 100 MG: 100 CAPSULE, LIQUID FILLED ORAL at 20:49

## 2019-01-01 RX ADMIN — FUROSEMIDE 40 MG: 40 TABLET ORAL at 08:03

## 2019-01-01 RX ADMIN — INSULIN GLARGINE 15 UNITS: 100 INJECTION, SOLUTION SUBCUTANEOUS at 21:21

## 2019-01-01 RX ADMIN — CITALOPRAM 20 MG: 20 TABLET, FILM COATED ORAL at 09:17

## 2019-01-01 RX ADMIN — IPRATROPIUM BROMIDE AND ALBUTEROL SULFATE 3 ML: 2.5; .5 SOLUTION RESPIRATORY (INHALATION) at 08:55

## 2019-01-01 RX ADMIN — INSULIN GLARGINE 15 UNITS: 100 INJECTION, SOLUTION SUBCUTANEOUS at 21:05

## 2019-01-01 RX ADMIN — SODIUM CHLORIDE, PRESERVATIVE FREE 10 ML: 5 INJECTION INTRAVENOUS at 20:08

## 2019-01-01 RX ADMIN — LIDOCAINE HYDROCHLORIDE 10 ML: 10 INJECTION, SOLUTION INFILTRATION; PERINEURAL at 13:08

## 2019-01-01 RX ADMIN — INSULIN LISPRO 3 UNITS: 100 INJECTION, SOLUTION INTRAVENOUS; SUBCUTANEOUS at 21:32

## 2019-01-01 RX ADMIN — CITALOPRAM 20 MG: 20 TABLET, FILM COATED ORAL at 08:00

## 2019-01-01 RX ADMIN — IPRATROPIUM BROMIDE AND ALBUTEROL SULFATE 3 ML: 2.5; .5 SOLUTION RESPIRATORY (INHALATION) at 19:40

## 2019-01-01 RX ADMIN — IPRATROPIUM BROMIDE AND ALBUTEROL SULFATE 3 ML: 2.5; .5 SOLUTION RESPIRATORY (INHALATION) at 16:46

## 2019-01-01 RX ADMIN — SODIUM CHLORIDE, PRESERVATIVE FREE 10 ML: 5 INJECTION INTRAVENOUS at 21:20

## 2019-01-01 RX ADMIN — PRAVASTATIN SODIUM 40 MG: 40 TABLET ORAL at 18:25

## 2019-01-01 RX ADMIN — PRAVASTATIN SODIUM 40 MG: 40 TABLET ORAL at 21:19

## 2019-01-01 RX ADMIN — METOPROLOL TARTRATE 25 MG: 25 TABLET ORAL at 20:08

## 2019-01-01 RX ADMIN — TAMSULOSIN HYDROCHLORIDE 0.4 MG: 0.4 CAPSULE ORAL at 08:00

## 2019-01-01 RX ADMIN — METOPROLOL TARTRATE 25 MG: 25 TABLET ORAL at 21:19

## 2019-02-26 PROBLEM — N40.0 BENIGN PROSTATIC HYPERPLASIA WITHOUT LOWER URINARY TRACT SYMPTOMS: Status: ACTIVE | Noted: 2019-01-01

## 2019-02-26 PROBLEM — E78.5 HYPERLIPIDEMIA: Status: ACTIVE | Noted: 2019-01-01

## 2019-02-26 PROBLEM — E11.9 DIABETES MELLITUS (HCC): Status: ACTIVE | Noted: 2019-01-01

## 2019-02-26 PROBLEM — F39 UNSPECIFIED MOOD (AFFECTIVE) DISORDER (HCC): Status: ACTIVE | Noted: 2019-01-01

## 2019-02-26 PROBLEM — F32.A DEPRESSION: Status: ACTIVE | Noted: 2019-01-01

## 2019-02-26 PROBLEM — I10 HYPERTENSION: Status: ACTIVE | Noted: 2019-01-01

## 2019-02-26 PROBLEM — K21.9 GASTROESOPHAGEAL REFLUX DISEASE WITHOUT ESOPHAGITIS: Status: ACTIVE | Noted: 2019-01-01

## 2019-02-26 NOTE — PROGRESS NOTES
Subjective   Pablo Collier is a 74 y.o. male.     Chief Complaint   Patient presents with   • New Patient Visit   • Diabetes   • Hypertension         History of Present Illness     Patient has type 2 diabetes.  He is currently taking Actos 30 mg daily.  Patient is also on insulin.  He is currently taking Levemir insulin.  He is doing 50 units of Levemir every night.  Patient is also taking NovoLog insulin 14 units before breakfast and 12 units before lunch and supper plus the sliding scale.  Patient states that he does check his blood glucose levels in the morning.  Patient does note that he has many days where his blood glucose levels are in the upper 100s perhaps close to 200.  He also notes that he also has days where his blood glucose levels are in the low 100s.    Retention.  Patient is currently taking amlodipine 10 mg daily.  He is also taking metoprolol tartrate 25 mg twice a day.  Blood pressure at today's office visit is currently normal at 142/80.  He is also taking Hyzaar 50-12.5 mg daily.  Patient denies any side effects of blood pressure medications.    Patient has a past medical history for hyperlipidemia.  He is currently taking 40 mg of pravastatin daily.  He is also on fenofibrate 160 mg daily.  He does not have any side effects of this medication.    Also has benig prostatic hyperplasia.  He is currently taking Flomax.  He denies any side effects of the medication.    Also has a history of GERD.  He is currently taking Protonix 40 mg daily.  He denies any side effects of this medication.    Patient also has some underlying depression and unspecified mood disorder.  He has been taking Celexa 20 mg daily.  He has been on this medication for quite some time.  Patient states that the medicine seems to be working well for him.    Also has a past medical history for BPH.  He has been taking Flomax daily.  He denies any side effects of the medication.     The following portions of the patient's history  were reviewed and updated as appropriate: allergies, current medications, past family history, past medical history, past social history, past surgical history and problem list.    Review of Systems   Constitutional: Negative for chills and fever.   HENT: Negative for congestion, rhinorrhea, sinus pain and sore throat.    Eyes: Negative for photophobia and visual disturbance.   Respiratory: Negative for cough, chest tightness and shortness of breath.    Cardiovascular: Negative for chest pain and palpitations.   Gastrointestinal: Negative for diarrhea, nausea and vomiting.   Genitourinary: Negative for dysuria, frequency and urgency.   Skin: Negative for rash and wound.   Neurological: Negative for dizziness and syncope.   Psychiatric/Behavioral: Negative for behavioral problems and confusion.       Objective   Physical Exam   Constitutional: He is oriented to person, place, and time. He appears well-developed and well-nourished.   HENT:   Head: Normocephalic and atraumatic.   Right Ear: External ear normal.   Left Ear: External ear normal.   Mouth/Throat: Oropharynx is clear and moist.   Eyes: EOM are normal.   Neck: Normal range of motion. Neck supple.   Cardiovascular: Normal rate, regular rhythm and normal heart sounds.   Pulmonary/Chest: Effort normal and breath sounds normal. No respiratory distress.   Abdominal: There is no guarding.   Musculoskeletal: Normal range of motion.   Lymphadenopathy:     He has no cervical adenopathy.   Neurological: He is alert and oriented to person, place, and time.   Skin: Skin is warm.   Psychiatric: He has a normal mood and affect. His behavior is normal.   Nursing note and vitals reviewed.      Assessment/Plan   Pablo was seen today for new patient visit, diabetes and hypertension.    Diagnoses and all orders for this visit:    Type 2 diabetes mellitus with complication, with long-term current use of insulin (CMS/[facility])  -     pioglitazone (ACTOS) 30 MG tablet; Take 1 tablet by  mouth Daily.  -     NOVOLOG FLEXPEN 100 UNIT/ML solution pen-injector sc pen; INJECT 14 UNITS BEFORE BREAKFAST AND 12 UNITS BEFORE LUNCH AND SUPPER PLUS SLIDING SCALE  -     insulin detemir (LEVEMIR FLEXTOUCH) 100 UNIT/ML injection; Inject 50 Units under the skin into the appropriate area as directed Every Night.  -     Comprehensive Metabolic Panel  -     Hemoglobin A1c    Essential hypertension  -     losartan-hydrochlorothiazide (HYZAAR) 50-12.5 MG per tablet; Take 1 tablet by mouth Daily.  -     amLODIPine (NORVASC) 10 MG tablet; Take 1 tablet by mouth Daily.  -     metoprolol tartrate (LOPRESSOR) 25 MG tablet; Take 1 tablet by mouth 2 (Two) Times a Day.  -     Comprehensive Metabolic Panel  -     Lipid Panel With LDL / HDL Ratio    Hyperlipidemia, unspecified hyperlipidemia type  -     pravastatin (PRAVACHOL) 40 MG tablet; Take 1 tablet by mouth every night at bedtime.  -     fenofibrate 160 MG tablet; Take 1 tablet by mouth Daily.  -     Comprehensive Metabolic Panel  -     Lipid Panel With LDL / HDL Ratio    Unspecified mood (affective) disorder (CMS/[facility])  -     citalopram (CeleXA) 20 MG tablet; Take 1 tablet by mouth Daily.    Benign prostatic hyperplasia without lower urinary tract symptoms  -     tamsulosin (FLOMAX) 0.4 MG capsule 24 hr capsule; Take 1 capsule by mouth Daily.    Gastroesophageal reflux disease without esophagitis  -     pantoprazole (PROTONIX) 40 MG EC tablet; Take 1 tablet by mouth Daily.    Depression, unspecified depression type  -     citalopram (CeleXA) 20 MG tablet; Take 1 tablet by mouth Daily.          No Follow-up on file.    Dictated utilizing Dragon Voice Recognition Software

## 2019-02-28 NOTE — TELEPHONE ENCOUNTER
"[facility] faxed our office stating that the patient was prescribed fenofibrate and pravastatin, per [facility] \"coadminitration of fenofibrate and statin therapy may increase the risk of rhabdomyolysis compared to either agent alone.\" Per Dr. Anderson have patient discontinue fenofibrate and continue pravastatin. [facility] notified.  "

## 2019-03-08 NOTE — TELEPHONE ENCOUNTER
----- Message from Ricardo Anderson MD sent at 3/6/2019  9:59 PM EST -----  Please inform the patient of the following abnormal results.  Elevated serum creatnine. Maybe patients baseline. Will repeat CMP in 1 mos.

## 2019-03-08 NOTE — TELEPHONE ENCOUNTER
Patient's daughter notified (okay per HIPAA) and voiced understanding. Patient's daughter advised to have patient contact office if they have any questions. Patient's daughter stated that kidney function should be rechecked at office visit scheduled for 4/2/19.

## 2019-04-02 PROBLEM — R06.02 SHORTNESS OF BREATH ON EXERTION: Status: ACTIVE | Noted: 2019-01-01

## 2019-04-02 PROBLEM — R07.89 ATYPICAL CHEST PAIN: Status: ACTIVE | Noted: 2019-01-01

## 2019-04-02 NOTE — ED PROVIDER NOTES
" EMERGENCY DEPARTMENT ENCOUNTER    Room Number:  11/11  Date seen:  4/2/2019  Time seen: 5:55 PM  PCP: Ricardo Anderson MD  Historian: pt      HPI:  Chief Complaint: SOA    Context: Pablo Collier is a 74 y.o. male who presents to the ED c/o chronic SOA. Pt reports he was walking in the parking lot and he was \"gasping for air\". Pt admits leg swelling. Pt denies any changes recently. Pt reports he slept in a recliner for years. Pt is not on any blood thinners. Pt is a type 2 diabetic. Pt reports a hx of bypass surgery. Pt reports a hx of smoking.    Quality: \"gasping for air\"  Intensity/Severity: moderate   Duration: chronic   Onset quality: gradual  Timing: constant   Progression: unchanged   Aggravating Factors: ambulation  Alleviating Factors: none mentioned   Previous Episodes: none   Treatment before arrival: none   Associated Symptoms: leg swelling    PAST MEDICAL HISTORY  Active Ambulatory Problems     Diagnosis Date Noted   • Diabetes mellitus (CMS/HCC) 02/26/2019   • Hypertension 02/26/2019   • Hyperlipidemia 02/26/2019   • Depression 02/26/2019   • Benign prostatic hyperplasia without lower urinary tract symptoms 02/26/2019   • Gastroesophageal reflux disease without esophagitis 02/26/2019   • Atypical chest pain 04/02/2019   • Shortness of breath on exertion 04/02/2019     Resolved Ambulatory Problems     Diagnosis Date Noted   • No Resolved Ambulatory Problems     Past Medical History:   Diagnosis Date   • Diabetes mellitus (CMS/HCC)    • Hyperlipidemia    • Hypertension    • Myocardial infarction (CMS/HCC)          PAST SURGICAL HISTORY  Past Surgical History:   Procedure Laterality Date   • COLONOSCOPY  10/30/2019    DR. MARQUISE DE DIOS [place], [place]   • CORONARY ARTERY BYPASS GRAFT      DONE IN [place]   • REPLACEMENT TOTAL KNEE Bilateral     DONE IN [place]         FAMILY HISTORY  Family History   Problem Relation Age of Onset   • Cancer Mother    • Diabetes Mother  "   • Cancer Father          SOCIAL HISTORY  Social History     Socioeconomic History   • Marital status:      Spouse name: Not on file   • Number of children: Not on file   • Years of education: Not on file   • Highest education level: Not on file   Tobacco Use   • Smoking status: Former Smoker   • Smokeless tobacco: Never Used   Substance and Sexual Activity   • Alcohol use: Yes     Comment: OCC   • Drug use: No   • Sexual activity: Yes     Partners: Female         ALLERGIES  Patient has no known allergies.        REVIEW OF SYSTEMS  Review of Systems   Constitutional: Negative for fever.   HENT: Negative for sore throat.    Respiratory: Positive for shortness of breath.    Cardiovascular: Positive for leg swelling. Negative for chest pain.   Gastrointestinal: Negative for abdominal pain.   Endocrine: Negative for polyuria.   Genitourinary: Negative for dysuria.   Musculoskeletal: Negative for neck pain.   Skin: Negative for rash.   Neurological: Negative for headaches.   All other systems reviewed and are negative.           PHYSICAL EXAM  ED Triage Vitals [04/02/19 1743]   Temp Heart Rate Resp BP SpO2   99.4 °F (37.4 °C) 68 18 (!) 192/97 97 %      Temp src Heart Rate Source Patient Position BP Location FiO2 (%)   Tympanic -- -- -- --         GENERAL: not distressed  HENT: nares patent  EYES: no scleral icterus  CV: regular rhythm, regular rate  RESPIRATORY: normal effort. Lungs CTAB  ABDOMEN: soft, nontender  MUSCULOSKELETAL: no deformity, 2+ edema bilaterally   NEURO: alert, MACE, FC  SKIN: warm, dry    Vital signs and nursing notes reviewed.          LAB RESULTS  Recent Results (from the past 24 hour(s))   Comprehensive Metabolic Panel    Collection Time: 04/02/19  6:07 PM   Result Value Ref Range    Glucose 68 65 - 99 mg/dL    BUN 26 (H) 8 - 23 mg/dL    Creatinine 1.46 (H) 0.76 - 1.27 mg/dL    Sodium 143 136 - 145 mmol/L    Potassium 4.0 3.5 - 5.2 mmol/L    Chloride 104 98 - 107 mmol/L    CO2 29.0 22.0 -  29.0 mmol/L    Calcium 9.1 8.6 - 10.5 mg/dL    Total Protein 7.2 6.0 - 8.5 g/dL    Albumin 4.30 3.50 - 5.20 g/dL    ALT (SGPT) 15 1 - 41 U/L    AST (SGOT) 21 1 - 40 U/L    Alkaline Phosphatase 54 39 - 117 U/L    Total Bilirubin 0.4 0.2 - 1.2 mg/dL    eGFR Non African Amer 47 (L) >60 mL/min/1.73    Globulin 2.9 gm/dL    A/G Ratio 1.5 g/dL    BUN/Creatinine Ratio 17.8 7.0 - 25.0    Anion Gap 10.0 mmol/L   BNP    Collection Time: 04/02/19  6:07 PM   Result Value Ref Range    proBNP 541.0 5.0 - 900.0 pg/mL   Troponin    Collection Time: 04/02/19  6:07 PM   Result Value Ref Range    Troponin T <0.010 0.000 - 0.030 ng/mL   CBC Auto Differential    Collection Time: 04/02/19  6:07 PM   Result Value Ref Range    WBC 6.04 3.40 - 10.80 10*3/mm3    RBC 4.05 (L) 4.14 - 5.80 10*6/mm3    Hemoglobin 11.6 (L) 13.0 - 17.7 g/dL    Hematocrit 37.4 (L) 37.5 - 51.0 %    MCV 92.3 79.0 - 97.0 fL    MCH 28.6 26.6 - 33.0 pg    MCHC 31.0 (L) 31.5 - 35.7 g/dL    RDW 14.7 12.3 - 15.4 %    RDW-SD 49.7 37.0 - 54.0 fl    MPV 9.8 6.0 - 12.0 fL    Platelets 111 (L) 140 - 450 10*3/mm3    Neutrophil % 62.6 42.7 - 76.0 %    Lymphocyte % 21.4 19.6 - 45.3 %    Monocyte % 12.3 (H) 5.0 - 12.0 %    Eosinophil % 2.3 0.3 - 6.2 %    Basophil % 0.7 0.0 - 1.5 %    Immature Grans % 0.7 (H) 0.0 - 0.5 %    Neutrophils, Absolute 3.79 1.40 - 7.00 10*3/mm3    Lymphocytes, Absolute 1.29 0.70 - 3.10 10*3/mm3    Monocytes, Absolute 0.74 0.10 - 0.90 10*3/mm3    Eosinophils, Absolute 0.14 0.00 - 0.40 10*3/mm3    Basophils, Absolute 0.04 0.00 - 0.20 10*3/mm3    Immature Grans, Absolute 0.04 0.00 - 0.05 10*3/mm3    nRBC 0.0 0.0 - 0.0 /100 WBC   POC Glucose Once    Collection Time: 04/02/19  8:43 PM   Result Value Ref Range    Glucose 85 70 - 130 mg/dL       Ordered the above labs and reviewed the results.        RADIOLOGY  CT Angiogram Chest With Contrast   Final Result      XR Chest 2 View   Final Result               PROCEDURES  Procedures        EKG:           EKG time:  1758  Rhythm/Rate: NSR, 68  P waves and CA: 1st degree AV block  QRS, axis: normal   ST and T waves: subtle ST depression in V1-V4     Interpreted Contemporaneously by me, independently viewed  No prior for comparison.        MEDICATIONS GIVEN IN ER  Medications   sodium chloride 0.9 % flush 10 mL (not administered)   levoFLOXacin (LEVAQUIN) tablet 750 mg (not administered)   iopamidol (ISOVUE-370) 76 % injection 100 mL (95 mL Intravenous Given by Other 4/2/19 1922)             PROGRESS AND CONSULTS       1803  Performing a bedside ultrasound which revealed no B lines.     1806  Ordered CXR and troponin for further evaluation.     1847  Ordered CTA chest for further evaluation.     2055  Rechecked pt. Pt is resting comfortably. Notified pt of lab results and imaging which revealed small aneurysm and pneumonia. Discussed the plan to discharge. Pt understands and agrees with the plan, all questions answered.    2101  Ordered Levaquin for pneumonia.       MEDICAL DECISION MAKING      MDM  Number of Diagnoses or Management Options  Community acquired pneumonia of right lower lobe of lung (CMS/HCC):   Shortness of breath:   Thoracic aortic aneurysm without rupture (CMS/HCC):   Diagnosis management comments: Mr. Collier presents complaining of shortness of breath.  Differential includes pneumonia, pneumothorax, PE, CHF, ACS.  I reviewed the note earlier today which showed concern for chest pain.  However, after reviewing this note and discussed with the patient, he consistently denies having any chest pain whatsoever.  He reports isolated shortness of breath.  Troponin is negative.  EKG shows no acute ischemic findings.  CT scan shows no evidence of PE although there is some consolidation that is concerning for pneumonia versus malignancy.  I am going to treat him empirically with antibiotics adjusted for his renal function.  I then recommended 6-8-week CT scan follow-up to evaluate for clearance of this consolidation.   He does have a smoking history and I am concerned that there may be underlying malignancy.       Amount and/or Complexity of Data Reviewed  Clinical lab tests: ordered and reviewed (BUN= 26  Creatinine= 1.46  HGB= 11.6)  Tests in the radiology section of CPT®: ordered and reviewed (Right lung base consolidation)  Tests in the medicine section of CPT®: ordered and reviewed (See EKG procedure note.)  Decide to obtain previous medical records or to obtain history from someone other than the patient: yes  Review and summarize past medical records: yes (Pt's previous medical records show pt had an office visit w internal medicine today and was dx w atypical chest pain and SOA on exertion.)  Independent visualization of images, tracings, or specimens: yes (Right lung base consolidation)               DIAGNOSIS  Final diagnoses:   Community acquired pneumonia of right lower lobe of lung (CMS/HCC)   Shortness of breath   Thoracic aortic aneurysm without rupture (CMS/HCC)         DISPOSITION  DISCHARGE    Patient discharged in stable condition.    Reviewed implications of results, diagnosis, meds, responsibility to follow up, warning signs and symptoms of possible worsening, potential complications and reasons to return to ER.    Patient/Family voiced understanding of above instructions.    Discussed plan for discharge, as there is no emergent indication for admission. Patient referred to primary care provider for BP management due to today's BP. Pt/family is agreeable and understands need for follow up and repeat testing.  Pt is aware that discharge does not mean that nothing is wrong but it indicates no emergency is present that requires admission and they must continue care with follow-up as given below or physician of their choice.     FOLLOW-UP  Ricardo Anderson MD  [id number] [id number] [phone]    Schedule an appointment as soon as possible for a visit   6-8 weeks to get repeat  chest CT to ensure that your lungs have cleared at the right lung base         Medication List      New Prescriptions    levoFLOXacin 750 MG tablet  Commonly known as:  LEVAQUIN  Take 1 tablet by mouth Every Other Day for 3 doses.        Changed    NOVOLOG FLEXPEN 100 UNIT/ML solution pen-injector sc pen  Generic drug:  insulin aspart  INJECT 14 UNITS BEFORE BREAKFAST AND 12 UNITS BEFORE LUNCH AND SUPPER PLUS   SLIDING SCALE  What changed:  additional instructions                      Latest Documented Vital Signs:  As of 9:04 PM  BP- 151/87 HR- 63 Temp- 99.4 °F (37.4 °C) (Tympanic) O2 sat- 95%        --  Documentation assistance provided by isamar Kat for Dr. Chris MD.  Information recorded by the scribe was done at my direction and has been verified and validated by me.               Rox Kat  04/02/19 2106       Jg Perez II, MD  04/03/19 0012

## 2019-04-02 NOTE — PROGRESS NOTES
Subjective   Pablo Collier is a 74 y.o. male.     Chief Complaint   Patient presents with   • Diabetes   • Hypertension   • Hyperlipidemia   • Shortness of Breath         History of Present Illness     Patient presents today with atypical chest pain.  However patient has shortness of breath on exertion.  Further history, patient does have a past medical history of having coronary artery disease as well as hypertension.  He did have an coronary artery bypass done when he was staying in [place].  Patient notes that his oxygen saturation is good.  However he is having some dyspnea on exertion.  An EKG was done at today's office visit which shows a new first-degree heart block.  However no other prior EKGs were used for comparison as they are not available.  Patient EKG also shows some nonspecific ST changes.  Patient also has other risk factors as also having type 2 diabetes.      ECG 12 Lead  Date/Time: 4/2/2019 5:01 PM  Performed by: Ricardo Anderson MD  Authorized by: Ricardo Anderson MD   Comparison: not compared with previous ECG   Previous ECG: no previous ECG available  Rhythm: sinus rhythm  Rate: normal  Conduction: 1st degree AV block  QRS axis: normal  Other findings: non-specific ST-T wave changes    Clinical impression: abnormal EKG              The following portions of the patient's history were reviewed and updated as appropriate: allergies, current medications, past family history, past medical history, past social history, past surgical history and problem list.    Review of Systems   Constitutional: Negative for chills and fever.   HENT: Negative for congestion, rhinorrhea, sinus pain and sore throat.    Eyes: Negative for photophobia and visual disturbance.   Respiratory: Positive for chest tightness and shortness of breath. Negative for cough.    Cardiovascular: Positive for chest pain. Negative for palpitations.   Gastrointestinal: Negative for diarrhea, nausea and vomiting.    Genitourinary: Negative for dysuria, frequency and urgency.   Skin: Negative for rash and wound.   Neurological: Negative for dizziness and syncope.   Psychiatric/Behavioral: Negative for behavioral problems and confusion.       Objective   Physical Exam   Constitutional: He is oriented to person, place, and time. He appears well-developed and well-nourished.   HENT:   Head: Normocephalic and atraumatic.   Right Ear: External ear normal.   Left Ear: External ear normal.   Eyes: EOM are normal.   Neck: Normal range of motion. Neck supple.   Cardiovascular: Normal rate, regular rhythm and normal heart sounds.   Pulmonary/Chest: Effort normal and breath sounds normal. No respiratory distress.   Musculoskeletal: Normal range of motion.   Lymphadenopathy:     He has no cervical adenopathy.   Neurological: He is alert and oriented to person, place, and time.   Skin: Skin is warm.   Psychiatric: He has a normal mood and affect. His behavior is normal.   Nursing note and vitals reviewed.      Assessment/Plan   Pablo was seen today for diabetes, hypertension, hyperlipidemia and shortness of breath.    Diagnoses and all orders for this visit:    Atypical chest pain    Shortness of breath on exertion    I discussed the patient at today's office visit due to his shortness of breath on exertion and atypical chest pain.  I prefer patient to be evaluated in the emergency room for further evaluation.  I have given him 325 mg of aspirin.  Discussed with patient that he would benefit if he goes by EMS emergency room for further evaluation.  Also discussed with patient that he is at significant risk factors for cardiovascular disease with his history of prior bypass surgery and type 2 diabetes as well as obesity.        No Follow-up on file.    Dictated utilizing Dragon Voice Recognition Software

## 2019-04-16 NOTE — PROGRESS NOTES
Patient: Pablo Collier  YOB: 1945 74 y.o. male  Medical Record Number: [id number]    Chief Complaints:   Chief Complaint   Patient presents with   • Left Knee - Establish Care, Pain   • Right Knee - Establish Care, Pain       History of Present Illness:Pablo Collier is a 74 y.o. male who presents with bilateral left greater than right knee pain.  He has had both knees replaced in 2012 in [place].  All things considered the wounds have done reasonably well.  Over the last couple years she has had multiple falls.  He is in general a very complicated medical patient.  He has morbid obesity diabetes and has been quite slow down over the last couple years.  A few months ago he had a fall with a direct blow to the anterior aspect of the left knee.  The knee is still fairly sore.  His legs are quite weak and he has difficulty getting around performing basic activities.    Allergies: No Known Allergies    Medications:   Current Outpatient Medications   Medication Sig Dispense Refill   • amLODIPine (NORVASC) 10 MG tablet Take 1 tablet by mouth Daily. 30 tablet 6   • aspirin 81 MG EC tablet Take 81 mg by mouth Daily.     • B Complex-C (SUPER B COMPLEX PO) Take 1 tablet by mouth Daily.     • cholecalciferol (VITAMIN D3) 1000 units tablet Take 1,000 Units by mouth Daily.     • citalopram (CeleXA) 20 MG tablet Take 1 tablet by mouth Daily. 30 tablet 9   • Docusate Calcium (STOOL SOFTENER PO) Take 1 capsule by mouth 2 (Two) Times a Day.     • insulin detemir (LEVEMIR FLEXTOUCH) 100 UNIT/ML injection Inject 50 Units under the skin into the appropriate area as directed Every Night. 15 mL 6   • losartan-hydrochlorothiazide (HYZAAR) 50-12.5 MG per tablet Take 1 tablet by mouth Daily. 30 tablet 6   • metoprolol tartrate (LOPRESSOR) 25 MG tablet Take 1 tablet by mouth 2 (Two) Times a Day. 60 tablet 6   • Multiple Vitamin (MULTI-VITAMIN DAILY PO) Take 1 tablet by mouth Daily.     • NOVOLOG FLEXPEN 100 UNIT/ML solution  "pen-injector sc pen INJECT 14 UNITS BEFORE BREAKFAST AND 12 UNITS BEFORE LUNCH AND SUPPER PLUS SLIDING SCALE (Patient taking differently: SLIDING SCALE) 15 mL 6   • pantoprazole (PROTONIX) 40 MG EC tablet Take 1 tablet by mouth Daily. 30 tablet 1   • pioglitazone (ACTOS) 30 MG tablet Take 1 tablet by mouth Daily. 30 tablet 6   • pravastatin (PRAVACHOL) 40 MG tablet Take 1 tablet by mouth every night at bedtime. 30 tablet 6   • tamsulosin (FLOMAX) 0.4 MG capsule 24 hr capsule Take 1 capsule by mouth Daily. 30 capsule 0   • glucose blood (ACCU-CHEK GUNNER) test strip USE TO TEST BLOOD SUGARS THREE TIMES DAILY AS DIRECTED BY PRESCRIBER DX: E11.00 100 each 12     No current facility-administered medications for this visit.          The following portions of the patient's history were reviewed and updated as appropriate: allergies, current medications, past family history, past medical history, past social history, past surgical history and problem list.    Review of Systems:   A 14 point review of systems was performed. All systems negative except pertinent positives/negative listed in HPI above    Physical Exam:   Vitals:    04/16/19 1556   Temp: 98 °F (36.7 °C)   Weight: (!) 150 kg (331 lb)   Height: 182.9 cm (72\")       General: A and O x 3, ASA, NAD    SCLERA:    Normal    DENTITION:   Normal  He has marketed swelling from the tibial tubercle distal 1-2+ pitting.  There is a general erythematous shoe over the lower extremities but no obvious cellulitis fluctuance or signs of infection.  There are well-healed midline knee incisions there is no joint effusion.  Both knees are stable in flexion and extension without any evidence of laxity.  The patella tracked midline.  Quad strength is 4 out of 5.  He has a very slow short stride length gait and walks with the knees slightly flexed in the feet with an external rotation foot progression angle of around 30 degrees       Radiology:  Xrays 3views left knee (ap,lateral, " sunrise) were ordered and reviewed for evaluation of knee pain demonstrating a well positioned knee replacement without evidence of wear, loosening or osteolysis. There are no previous films for comparision.    Assessment/Plan: Bilateral total knee replacements left knee with recent contusion he has leg weakness.  I am going to send him to physical therapy for quad strength and hamstring stretching.  I see no mechanical problems with the knees.  His major issue is his soft tissue envelope from his general medical decondition status.  I have applied some Ace wraps from the ankle proximal and showed both he and his wife had applied Ace wraps for swelling.  I think he needs to see his medical doctor and possibly consider medical measures of edema control if indicated.  Otherwise I have no further orthopedic intervention to recommend.      Tera Son MD  4/16/2019

## 2019-05-07 NOTE — PROGRESS NOTES
Physical Therapy Initial Evaluation and Plan of Care      Patient: Pablo Collier   : 1945  Diagnosis/ICD-10 Code:  Leg weakness, bilateral [R29.898]  Referring practitioner: Tera Son MD  Date of Initial Visit: 2019  Today's Date: 2019          Subjective Evaluation    History of Present Illness  Onset date: ~ 2 months ago.  Mechanism of injury: Patient reports that he has had (B) knee replacements. He notes that he typically falls ~ 1x per month.  2-3 months ago he fell and bruised his knee badly. He is unsure how he fell. He has been in to see Dr. Son and was told that his knees were not damaged and that he needed to work on strengthening of his LE's.     Patient is a diabetic   Patient has had a 4x bypass in .   Patient is a poor historian and has difficult recalling his [facility]    Pain  Current pain ratin  Location: L knee  Quality: pressure and dull ache  Relieving factors: relaxation and rest  Aggravating factors: stairs, lifting, standing, ambulation and squatting    Diagnostic Tests  X-ray: normal    Patient Goals  Patient goals for therapy: improved balance, decreased pain, increased motion, increased strength, independence with ADLs/IADLs and return to sport/leisure activities             Objective       Observations     Additional Observation Details  Patient has (B) pitting edema (B) L>R    Palpation     Additional Palpation Details  Minimal cortez knee tenderness      Neurological Testing     Sensation     Knee   Left Knee   Diminished: light touch     Right Knee   Diminished: light touch     Active Range of Motion   Left Knee   Flexion: 100 degrees with pain  Extensor la degrees     Right Knee   Flexion: 105 degrees with pain  Extensor lag: 10 degrees with pain    Strength/Myotome Testing     Left Hip   Planes of Motion   Flexion: 4  Abduction: 3+  External rotation: 4    Right Hip   Planes of Motion   Flexion: 4  Abduction: 3+  External rotation: 4    Left Knee   Flexion:  4  Extension: 4  Quadriceps contraction: fair    Right Knee   Flexion: 4  Extension: 4+  Quadriceps contraction: fair    Left Ankle/Foot   Dorsiflexion: 4+    Right Ankle/Foot   Dorsiflexion: 4+    Tests     Left Knee   Negative anterior drawer, valgus stress test at 0 degrees and varus stress test at 0 degrees.     Right Knee   Negative anterior drawer, valgus stress test at 0 degrees and varus stress test at 0 degrees.     Additional Tests Details  Patient has no pain with special testing  Noted some increased jt motion during M/L stress testing but was non symptomatic.     General Comments     Knee Comments  Poor SLS balance   He is unable to stand on either leg without LOB.   Patient using S. Cane for ambulation   Patient unable to stand from waiting room chair without UE use and significant effort and multiple trials.          Assessment & Plan     Assessment  Impairments: abnormal gait, abnormal or restricted ROM, activity intolerance, impaired balance, impaired physical strength, lacks appropriate home exercise program and pain with function  Assessment details: Patient presents with (B) knee pain that has been persistent since a fall 2-3 months ago. Patient is in poor health and has very limited activity tolerance. He has (B) LE weakness, poor endurance, and poor balance leading to high fall risk.  Pt would benefit from skilled PT services in order to address listed impairments and increase tolerance to normal daily activities including ADLs, work and recreational activities.             Barriers to therapy: Class III obese, DM, poor mobility, low activity tolerance.   Prognosis: fair  Prognosis details: Goals  2 weeks. Pt will:  1. Demonstrate improve (B) knee AROM of > 115 flexion  2. Report pain of </= 4/10 with all ADLs and HEP  3. Patient will tolerate 2000 steps a day with > 3/10 pain  4. Patient will be (I) with initial HEP for mobility and strengthening.     6 weeks. Pt will:  1. Patient will  "demonstrate sit to stand without UE from 20\" height  2. Patient will be (I) with his long term HEP for continued mobility improvement.   3. Patient will have no falls for > 3 weeks.   4. Patient will improve LE strength to > 4/5 (B)  5. Patient will perform 5x sit to  < 12 seconds.     Functional Limitations: walking and standing  Plan  Therapy options: will be seen for skilled physical therapy services  Planned modality interventions: electrical stimulation/Russian stimulation, high voltage pulsed current (pain management), iontophoresis, microcurrent electrical stimulation, TENS and ultrasound  Planned therapy interventions: manual therapy, motor coordination training, neuromuscular re-education, postural training, soft tissue mobilization, spinal/joint mobilization, strengthening, stretching, therapeutic activities, joint mobilization, IADL retraining, home exercise program, gait training, functional ROM exercises, flexibility, fine motor coordination training, body mechanics training, balance/weight-bearing training, ADL retraining and abdominal trunk stabilization  Frequency: 2x week  Duration in weeks: 8  Treatment plan discussed with: patient  Plan details: Initial HEP was given on this date.         Manual Therapy:         mins  52266;  Therapeutic Exercise:    15     mins  89301;     Neuromuscular Boy:        mins  17218;    Therapeutic Activity:          mins  60931;     Gait Training:           mins  00263;     Ultrasound:         mins  73373;    Electrical Stimulation:         mins  59779 ( );  Dry Needling          mins self-pay    Timed Treatment:   15   mins   Total Treatment:     58   mins    PT SIGNATURE: Amado Son PT DPT   [place] License # [id number]  DATE TREATMENT INITIATED: 5/7/2019    Initial Certification  Certification Period: 8/5/2019  I certify that the therapy services are furnished while this patient is under my care.  The services outlined above are required by this patient, " and will be reviewed every 90 days.     PHYSICIAN: Tera Son MD   ________________________________     DATE: ______________    Please sign and return via fax to [phone].. Thank you, [facility].

## 2019-12-11 NOTE — PROGRESS NOTES
Subjective   Pablo Collier is a 74 y.o. male.     Chief Complaint   Patient presents with   • 6 month f/u   • Shortness of Breath         History of Present Illness     Patient has a past medical history for essential hypertension.  Patient blood pressure is 135/89.  He is currently being treated with medication.  Current taking amlodipine 10 mg daily, losartan-hydrochlorothiazide 50-12.5 mg daily, and metoprolol tartrate 25 mg twice a day.  He denies any side effects of the medication.    Patient does have type 2 diabetes.  He currently takes Levemir 50 units at night, uses NovoLog sliding scale before his meals.  He is also taking Actos 30 mg daily patient denies any side effects of the medication.    Patient also has a history of hyperlipidemia.  Is currently on pravastatin 40 mg daily.  He denies any side effects of the medication.    At today's office visit patient states that he has been short of breath for several months now.  Patient has not had any evaluation done by specialists over the last several months.  Patient has had a history of prior smoking, however has not smoked in many years.  He is unable to give a exact amount of how much she is really smokes.  Patient does state that he has shortness of breath on exertion.  At today's office visit his O2 sats are 97%.  He is wheezing.  Is unclear if he has underlying history of asthma or COPD.    The following portions of the patient's history were reviewed and updated as appropriate: allergies, current medications, past family history, past medical history, past social history, past surgical history and problem list.    Review of Systems   Constitutional: Negative for chills and fever.   HENT: Negative for congestion, rhinorrhea, sinus pain and sore throat.    Eyes: Negative for photophobia and visual disturbance.   Respiratory: Negative for cough, chest tightness and shortness of breath.    Cardiovascular: Negative for chest pain and palpitations.    Gastrointestinal: Negative for diarrhea, nausea and vomiting.   Genitourinary: Negative for dysuria, frequency and urgency.   Skin: Negative for rash and wound.   Neurological: Negative for dizziness and syncope.   Psychiatric/Behavioral: Negative for behavioral problems and confusion.       Objective   Physical Exam   Constitutional: He is oriented to person, place, and time. He appears well-developed and well-nourished.   HENT:   Head: Normocephalic and atraumatic.   Right Ear: External ear normal.   Left Ear: External ear normal.   Eyes: EOM are normal.   Neck: Normal range of motion. Neck supple.   Cardiovascular: Normal rate, regular rhythm and normal heart sounds.   Pulmonary/Chest: Effort normal and breath sounds normal. No respiratory distress.   Musculoskeletal: Normal range of motion.   Lymphadenopathy:     He has no cervical adenopathy.   Neurological: He is alert and oriented to person, place, and time.   Skin: Skin is warm.   Psychiatric: He has a normal mood and affect. His behavior is normal.   Nursing note and vitals reviewed.      Vitals:    12/11/19 1249   BP: 135/89   Pulse: 62   Resp: 20   Temp: 97.7 °F (36.5 °C)   SpO2: 97%     Body mass index is 44.88 kg/m².      Assessment/Plan   Pablo was seen today for 6 month f/u and shortness of breath.    Diagnoses and all orders for this visit:    Shortness of breath on exertion  -     XR Chest 2 View  -     Ambulatory Referral to Cardiology  -     Ambulatory Referral to Pulmonology  -     CBC & Differential  -     Comprehensive Metabolic Panel  -     predniSONE (DELTASONE) 20 MG tablet; Take 2 tablets by mouth Daily for 5 days.  -     TRELEGY ELLIPTA 100-62.5-25 MCG/INH aerosol powder ; Inhale 1 each Daily.  -     albuterol sulfate  (90 Base) MCG/ACT inhaler; Inhale 2 puffs Every 4 (Four) Hours As Needed for Wheezing or Shortness of Air.  -     At today's office visit I have discussed with him that I will get a chest x-ray, and order several  labs.  I also discussed with him that I would like him to start Trelegy inhaler, which I have given him a sample at today's office.  After patient has taken the inhaler, approximately 10 minutes later he does notice some relief.  I have stressed to him that if his signs and symptoms do not improve, or he feels worse, he will need to go to the emergency room for further evaluation and treatment.  He and his family members have agreed to this.  I will also start him on prednisone 40 mg daily for 5 days.  We will also give him an albuterol inhaler.    Essential hypertension  -     CBC & Differential  -     Comprehensive Metabolic Panel  -     Blood pressures currently stable, will continue patient on current medications.    Hyperlipidemia, unspecified hyperlipidemia type  -     Lipid Panel With LDL / HDL Ratio  -     We will check lipids at today's office visit.  Continue pravastatin.    Type 2 diabetes mellitus without complication, with long-term current use of insulin (CMS/[facility])  -     Hemoglobin A1c  -     Insulin Pen Needle (NOVOFINE) 32G X 6 MM misc; Use with insulin    Type 2 diabetes mellitus with complication, with long-term current use of insulin (CMS/[facility])  -     insulin detemir (LEVEMIR FLEXTOUCH) 100 UNIT/ML injection; Inject 50 Units under the skin into the appropriate area as directed Every Night.  -     NOVOLOG FLEXPEN 100 UNIT/ML solution pen-injector sc pen; INJECT 14 UNITS BEFORE BREAKFAST AND 12 UNITS BEFORE LUNCH AND SUPPER. PATIENT NEEDS TO BE SEEN BEFORE NEXT REFILL  -     Insulin Pen Needle (NOVOFINE) 32G X 6 MM misc; Use with insulin  -     We will check a hemoglobin A1c at today's office visit, continue Levemir and NovoLog.  Continue Actos.    Other orders  -     Fluad Quad >65 years ([id number])          No follow-ups on file.    Dictated utilizing Dragon Voice Recognition Software          normal

## 2019-12-12 PROBLEM — J90 PLEURAL EFFUSION, RIGHT: Status: ACTIVE | Noted: 2019-01-01

## 2019-12-12 PROBLEM — I25.10 CAD (CORONARY ARTERY DISEASE): Status: ACTIVE | Noted: 2019-01-01

## 2019-12-12 PROBLEM — R91.8 LUNG MASS: Status: ACTIVE | Noted: 2019-01-01

## 2019-12-12 PROBLEM — J96.01 ACUTE HYPOXEMIC RESPIRATORY FAILURE (HCC): Status: ACTIVE | Noted: 2019-01-01

## 2019-12-12 PROBLEM — N18.30 CKD (CHRONIC KIDNEY DISEASE) STAGE 3, GFR 30-59 ML/MIN (HCC): Status: ACTIVE | Noted: 2019-01-01

## 2019-12-12 NOTE — NURSING NOTE
CT called to inform the RN that the CT needle BX and US thoracentesis will be done first thing in the morning.  Patient may eat this evening and clear liquid after midnight.  Patient aware of this.

## 2019-12-12 NOTE — OUTREACH NOTE
Care Coordination Note    Communication with ED care manager update of the following:   Spoke with Isabel RODRIGUEZ Henrynick patient in ED with respiratory failure. Patient was seen by PCP yesterday and provided treatment. Condition worsened and patient returned to ED. Gradual onset over 2 weeks. No prior ACM interaction. Assess for discharge needs if appropriate.     Gayla Quezada RN  Ambulatory     12/12/2019, 12:20 PM

## 2019-12-12 NOTE — PLAN OF CARE
Patient is stable at this time; daughter at bedside. Patient daughter states that he talks loud in his sleep.  Patient and family knows to use the call light to help get out of bed to prevent falls and SOA.  Patient's resp is 20-28 at times but denies SOA.

## 2019-12-12 NOTE — PROGRESS NOTES
Please inform the patient of the following abnormal results.  Large pleural effusion seen on right lung. Needs to go to ER as he continues to have shortness of breath.

## 2019-12-12 NOTE — H&P
"    Patient Name:  Pablo Collier  YOB: 1945  MRN:  [id number]  Admit Date:  12/12/2019  Patient Care Team:  Ricardo Anderson MD as PCP - General (Family Medicine)      Subjective   History Present Illness     Chief Complaint   Patient presents with   • Shortness of Breath     Pt had xray done at PCP's office yesterday and was called today and told it was abnormal. Complains of SOA.       History of Present Illness   Mr. Collier is a 74 y.o. former smoker with a history of DM2, CAD s/p CABG, HTN and HLD that presents to [facility] complaining of shortness of breath. The patient and his daughter are at bedside. History is somewhat limited as they are not the best historians. The patient recently moved in with his daughter and reports he used to live in [place] where he got most of his prior medical care. He states he used to see a Cardiologist there and has not seen one in a few years. He went to his PCP yesterday for complaints of shortness of breath that has been on and off for \"months.\" It looks like he was last here in April of this year for similar complaints where CTA imaging showed right lung base consolidation that was questionable pneumonia verus malignancy. He was sent home on oral antibiotics and told to get repeat imaging in 6 to 8 months. The patient denies any known history of CHF or COPD, but does report wheezing and worsening shortness of breath that started yesterday. At the PCP office yesterday, he had an x-ray done and was placed on Trelegy Ellipta, albuterol as needed and prednisone for 5 days. His daughter was called today and told to take him to the ED for a large effusion found on x-ray imaging.    In the ED, x-ray showed a moderately large right pleural effusion that was new since 4/2/19. CT chest was done showing right lower lobe collapsed with an obstructing mass suspected. A moderately large right pleural effusion was noted and partially loculated and " "there was also mild pleural thickening along the posterior aspect of the right lower thorax as well as the lateral aspect of the right middle lobe. His oxygen saturation on arrival was 81% so he was placed on nasal cannula and is currently on 3 L. He does not wear oxygen at home, but states \"I could use some.\" He states his dyspnea is worse with exertion and that he has a chronic cough of clear sputum. He denies any recent fever, chills, nausea, vomiting or diarrhea. He does have chronic lower extremity edema and reports some weight gain recently as he and his daughter eat out a lot. He denies any chest pain at this time, but states he does have some with coughing and deep breathing. He chronically sleeps in a recliner at home and states he cannot breathe lying flat. He was a prior smoker many years ago, but cannot remember when he quit. He states at one point he was smoking 3 PPD of cigarettes. He does report a history of fluid on his lung in the past and states that fluid studies were done at that time and negative for malignancy, but no record of this is available. He is being admitted for further work up of his pleural effusion as well as acute hypoxic respiratory failure.     Review of Systems   Constitutional: Positive for activity change and appetite change. Negative for chills, fatigue and fever.   HENT: Negative for congestion, ear pain and rhinorrhea.    Eyes: Negative for pain and discharge.   Respiratory: Positive for cough, shortness of breath and wheezing. Negative for choking and chest tightness.    Cardiovascular: Positive for leg swelling. Negative for chest pain.   Gastrointestinal: Negative for abdominal distention, abdominal pain, constipation, diarrhea, nausea and vomiting.   Endocrine: Negative for cold intolerance and heat intolerance.   Genitourinary: Negative for difficulty urinating and dysuria.   Musculoskeletal: Negative for arthralgias, back pain and gait problem.   Skin: Negative for " color change and pallor.   Neurological: Positive for dizziness. Negative for weakness and numbness.   Psychiatric/Behavioral: Negative for confusion. The patient is not nervous/anxious.       Personal History     Past Medical History:   Diagnosis Date   • Diabetes mellitus (CMS/HCC)    • Hyperlipidemia    • Hypertension    • Myocardial infarction (CMS/HCC)      Past Surgical History:   Procedure Laterality Date   • COLONOSCOPY  10/30/2019    DR. MARQUISE DE DIOS [place], [place]   • CORONARY ARTERY BYPASS GRAFT      DONE IN [place]   • REPLACEMENT TOTAL KNEE Bilateral     DONE IN [place]     Family History   Problem Relation Age of Onset   • Cancer Mother    • Diabetes Mother    • Cancer Father      Social History     Tobacco Use   • Smoking status: Former Smoker     Years: 37.00     Last attempt to quit: 2017     Years since quittin.0   • Smokeless tobacco: Never Used   Substance Use Topics   • Alcohol use: Yes     Comment: OCC   • Drug use: No     Medications Prior to Admission   Medication Sig Dispense Refill Last Dose   • albuterol sulfate  (90 Base) MCG/ACT inhaler Inhale 2 puffs Every 4 (Four) Hours As Needed for Wheezing or Shortness of Air. 1 inhaler 11 2019 at Unknown time   • amLODIPine (NORVASC) 10 MG tablet TAKE 1 TABLET BY MOUTH DAILY. 30 tablet 6 2019 at Unknown time   • aspirin 81 MG EC tablet Take 81 mg by mouth Daily.   2019 at Unknown time   • B Complex-C (SUPER B COMPLEX PO) Take 1 tablet by mouth Daily.   2019 at Unknown time   • cholecalciferol (VITAMIN D3) 1000 units tablet Take 1,000 Units by mouth Daily.   2019 at Unknown time   • citalopram (CeleXA) 20 MG tablet Take 1 tablet by mouth Daily. 30 tablet 9 2019 at Unknown time   • Docusate Calcium (STOOL SOFTENER PO) Take 1 capsule by mouth 2 (Two) Times a Day.   2019 at Unknown time   • glucose blood (ACCU-CHEK GUNNER) test strip USE TO TEST BLOOD SUGARS  THREE TIMES DAILY AS DIRECTED BY PRESCRIBER DX: E11.00 100 each 12 12/11/2019 at Unknown time   • insulin detemir (LEVEMIR FLEXTOUCH) 100 UNIT/ML injection Inject 50 Units under the skin into the appropriate area as directed Every Night. 15 mL 11 12/11/2019 at Unknown time   • Insulin Pen Needle (NOVOFINE) 32G X 6 MM misc Use with insulin 90 each 3 12/11/2019 at Unknown time   • losartan-hydrochlorothiazide (HYZAAR) 50-12.5 MG per tablet TAKE 1 TABLET BY MOUTH DAILY. 30 tablet 6 12/11/2019 at Unknown time   • metoprolol tartrate (LOPRESSOR) 25 MG tablet TAKE 1 TABLET BY MOUTH 2 (TWO) TIMES A DAY. 60 tablet 6 12/11/2019 at Unknown time   • Multiple Vitamin (MULTI-VITAMIN DAILY PO) Take 1 tablet by mouth Daily.   12/11/2019 at Unknown time   • NOVOLOG FLEXPEN 100 UNIT/ML solution pen-injector sc pen INJECT 14 UNITS BEFORE BREAKFAST AND 12 UNITS BEFORE LUNCH AND SUPPER. PATIENT NEEDS TO BE SEEN BEFORE NEXT REFILL 15 mL 0 12/11/2019 at Unknown time   • pantoprazole (PROTONIX) 40 MG EC tablet TAKE 1 TABLET BY MOUTH DAILY. 90 tablet 1 12/11/2019 at Unknown time   • pioglitazone (ACTOS) 30 MG tablet Take 1 tablet by mouth Daily. 30 tablet 6 12/11/2019 at Unknown time   • pravastatin (PRAVACHOL) 40 MG tablet Take 1 tablet by mouth every night at bedtime. 30 tablet 6 12/11/2019 at Unknown time   • predniSONE (DELTASONE) 20 MG tablet Take 2 tablets by mouth Daily for 5 days. 10 tablet 0 12/11/2019 at Unknown time   • tamsulosin (FLOMAX) 0.4 MG capsule 24 hr capsule TAKE 1 CAPSULE BY MOUTH DAILY. DX=N40.0 30 capsule 0 12/11/2019 at Unknown time   • TRELEGY ELLIPTA 100-62.5-25 MCG/INH aerosol powder  Inhale 1 each Daily. 1 each 11 12/11/2019 at Unknown time     Allergies:  No Known Allergies    Objective    Objective     Vital Signs  Temp:  [98.3 °F (36.8 °C)] 98.3 °F (36.8 °C)  Heart Rate:  [68-89] 77  Resp:  [26-30] 28  BP: (163-179)/(80-88) 163/80  SpO2:  [81 %-97 %] 96 %  on  Flow (L/min):  [2-3] (P) 2;   Device (Oxygen  Therapy): (P) nasal cannula  Body mass index is 42.09 kg/m².    Physical Exam   Constitutional: He is oriented to person, place, and time. No distress.   Acute and chronically ill appearing   HENT:   Head: Normocephalic and atraumatic.   Cardiovascular: Normal rate, regular rhythm, normal heart sounds and intact distal pulses.   Pulmonary/Chest: He is in respiratory distress (mild). He has decreased breath sounds in the right lower field. He has rales in the right lower field.   Musculoskeletal: He exhibits edema (1-2 + moderate BLE).   Neurological: He is alert and oriented to person, place, and time. He exhibits normal muscle tone. Coordination normal.   Skin: Skin is warm and dry. He is not diaphoretic. There is erythema (BLE).   Psychiatric: He has a normal mood and affect. His behavior is normal.   Nursing note and vitals reviewed.     Results Review:  I reviewed the patient's new clinical results.  I reviewed the patient's new imaging results and agree with the interpretation.  I reviewed the patient's other test results and agree with the interpretation  I personally viewed and interpreted the patient's EKG/Telemetry data    Lab Results (last 24 hours)     Procedure Component Value Units Date/Time    CBC & Differential [[id number]] Collected:  12/12/19 1109    Specimen:  Blood Updated:  12/12/19 1143    Narrative:       The following orders were created for panel order CBC & Differential.  Procedure                               Abnormality         Status                     ---------                               -----------         ------                     CBC Auto Differential[[id number]]        Abnormal            Final result                 Please view results for these tests on the individual orders.    Comprehensive Metabolic Panel [[id number]]  (Abnormal) Collected:  12/12/19 1109    Specimen:  Blood Updated:  12/12/19 1153     Glucose 133 mg/dL      BUN 20 mg/dL      Creatinine 1.37 mg/dL      Sodium  136 mmol/L      Potassium 4.1 mmol/L      Chloride 98 mmol/L      CO2 28.1 mmol/L      Calcium 9.4 mg/dL      Total Protein 7.6 g/dL      Albumin 4.10 g/dL      ALT (SGPT) 17 U/L      AST (SGOT) 22 U/L      Comment: Specimen hemolyzed.  Results may be affected.        Alkaline Phosphatase 67 U/L      Total Bilirubin 0.7 mg/dL      eGFR Non African Amer 51 mL/min/1.73      Globulin 3.5 gm/dL      A/G Ratio 1.2 g/dL      BUN/Creatinine Ratio 14.6     Anion Gap 9.9 mmol/L     Narrative:       GFR Normal >60  Chronic Kidney Disease <60  Kidney Failure <15      Protime-INR [[id number]]  (Abnormal) Collected:  12/12/19 1109    Specimen:  Blood Updated:  12/12/19 1131     Protime 15.1 Seconds      INR 1.22    Troponin [[id number]]  (Normal) Collected:  12/12/19 1109    Specimen:  Blood Updated:  12/12/19 1158     Troponin T <0.010 ng/mL     Narrative:       Troponin T Reference Range:  <= 0.03 ng/mL-   Negative for AMI  >0.03 ng/mL-     Abnormal for myocardial necrosis.  Clinicians would have to utilize clinical acumen, EKG, Troponin and serial changes to determine if it is an Acute Myocardial Infarction or myocardial injury due to an underlying chronic condition.     BNP [[id number]]  (Normal) Collected:  12/12/19 1109    Specimen:  Blood Updated:  12/12/19 1158     proBNP 394.2 pg/mL     Narrative:       Among patients with dyspnea, NT-proBNP is highly sensitive for the detection of acute congestive heart failure. In addition NT-proBNP of <300 pg/ml effectively rules out acute congestive heart failure with 99% negative predictive value.      Procalcitonin [[id number]]  (Normal) Collected:  12/12/19 1109    Specimen:  Blood Updated:  12/12/19 1158     Procalcitonin 0.14 ng/mL     Narrative:       As a Marker for Sepsis (Non-Neonates):   1. <0.5 ng/mL represents a low risk of severe sepsis and/or septic shock.  1. >2 ng/mL represents a high risk of severe sepsis and/or septic shock.    As a Marker for Lower Respiratory Tract  "Infections that require antibiotic therapy:  PCT on Admission     Antibiotic Therapy             6-12 Hrs later  > 0.5                Strongly Recommended            >0.25 - <0.5         Recommended  0.1 - 0.25           Discouraged                   Remeasure/reassess PCT  <0.1                 Strongly Discouraged          Remeasure/reassess PCT      As 28 day mortality risk marker: \"Change in Procalcitonin Result\" (> 80 % or <=80 %) if Day 0 (or Day 1) and Day 4 values are available. Refer to http://www.[facility] Ampl[facility]pct-calculator.com/   Change in PCT <=80 %   A decrease of PCT levels below or equal to 80 % defines a positive change in PCT test result representing a higher risk for 28-day all-cause mortality of patients diagnosed with severe sepsis or septic shock.  Change in PCT > 80 %   A decrease of PCT levels of more than 80 % defines a negative change in PCT result representing a lower risk for 28-day all-cause mortality of patients diagnosed with severe sepsis or septic shock.                  CBC Auto Differential [[id number]]  (Abnormal) Collected:  12/12/19 1109    Specimen:  Blood Updated:  12/12/19 1143     WBC 5.46 10*3/mm3      RBC 4.83 10*6/mm3      Hemoglobin 13.1 g/dL      Hematocrit 42.1 %      MCV 87.2 fL      MCH 27.1 pg      MCHC 31.1 g/dL      RDW 14.6 %      RDW-SD 47.0 fl      MPV 10.1 fL      Platelets 141 10*3/mm3      Neutrophil % 87.6 %      Lymphocyte % 7.3 %      Monocyte % 4.2 %      Eosinophil % 0.2 %      Basophil % 0.2 %      Immature Grans % 0.5 %      Neutrophils, Absolute 4.78 10*3/mm3      Lymphocytes, Absolute 0.40 10*3/mm3      Monocytes, Absolute 0.23 10*3/mm3      Eosinophils, Absolute 0.01 10*3/mm3      Basophils, Absolute 0.01 10*3/mm3      Immature Grans, Absolute 0.03 10*3/mm3      nRBC 0.0 /100 WBC     Basic Metabolic Panel [[id number]]  (Abnormal) Collected:  12/12/19 1109    Specimen:  Blood Updated:  12/12/19 1506     Glucose 134 mg/dL      BUN 20 mg/dL      Creatinine 1.43 " mg/dL      Sodium 141 mmol/L      Potassium 4.3 mmol/L      Chloride 98 mmol/L      CO2 28.3 mmol/L      Calcium 9.0 mg/dL      eGFR Non African Amer 48 mL/min/1.73      BUN/Creatinine Ratio 14.0     Anion Gap 14.7 mmol/L     Narrative:       GFR Normal >60  Chronic Kidney Disease <60  Kidney Failure <15      Blood Culture - Blood, Arm, Right [[id number]] Collected:  12/12/19 1141    Specimen:  Blood from Arm, Right Updated:  12/12/19 1155    Lactic Acid, Plasma [[id number]]  (Normal) Collected:  12/12/19 1141    Specimen:  Blood Updated:  12/12/19 1212     Lactate 1.8 mmol/L     Blood Culture - Blood, Arm, Left [[id number]] Collected:  12/12/19 1149    Specimen:  Blood from Arm, Left Updated:  12/12/19 1155    POC Glucose Once [[id number]]  (Normal) Collected:  12/12/19 1551    Specimen:  Blood Updated:  12/12/19 1552     Glucose 111 mg/dL           Imaging Results (Last 24 Hours)     Procedure Component Value Units Date/Time    CT Chest With Contrast [[id number]] Collected:  12/12/19 1433     Updated:  12/12/19 1433    Narrative:       CT CHEST WITH IV CONTRAST     HISTORY: 74-year-old male with hypoxia and a new pleural effusion.     TECHNIQUE: Radiation dose reduction techniques were utilized, including  automated exposure control and exposure modulation based on body size.   3 mm images were obtained through the chest after the administration of  IV contrast. Compared with yesterday's chest radiograph and chest CTA  from 04/02/2019.     FINDINGS: The right lower lobe is completely collapsed and has a rounded  masslike appearance. The right lower lobe bronchi are predominantly  within the superior medial aspect of the collapsed right lower lobe.  There is a moderately large right pleural effusion and effusion is  mildly loculated. There is mild pleural thickening at the right lower  thorax. There is also significant atelectatic change at the right middle  lobe and pleural thickening along the lateral aspect of the  right middle  lobe. There is chronic appearing scarring at the left lower lobe with  volume loss which appears similar. There is stable mild pleural  thickening at the posterior aspect of the left thorax. There are no  calcified pleural plaques. There are shotty mediastinal nodes. No  definite pathologic lymphadenopathy is seen. There is a 4 cm left  adrenal nodule which appears largely unchanged.       Impression:       1. The right lower lobe is completely collapsed and an obstructing mass  is suspected. There is a moderately large right pleural effusion which  is partially loculated and there is also mild pleural thickening along  the posterior aspect of the right lower thorax as well as at the lateral  aspect of the right middle lobe.  2. There is stable chronic-appearing scarring and volume loss at the  left lower lobe and there is also stable mild pleural thickening at the  posterior aspect of the left thorax.  3. Stable approximately 4 cm left adrenal nodule.                        ECG 12 Lead   Final Result   HEART RATE= 73  bpm   RR Interval= 824  ms   OH Interval= 181  ms   P Horizontal Axis= 0  deg   P Front Axis= 26  deg   QRSD Interval= 109  ms   QT Interval= 430  ms   QRS Axis= 45  deg   T Wave Axis= 41  deg   - BORDERLINE ECG -   Sinus rhythm   Borderline ST depression, anterior leads   When compared with ECG of 02-Apr-2019 17:58:51,   OH prolongation has resolved   Electronically Signed By: Rafael Grigsby ([facility]) 12-Dec-2019 12:34:00   Date and Time of Study: 2019-12-12 11:32:15           Assessment/Plan     Active Hospital Problems    Diagnosis POA   • Acute hypoxemic respiratory failure (CMS/HCC) [J96.01] Yes   • Pleural effusion, right [J90] Yes   • CAD (coronary artery disease) [I25.10] Yes   • CKD (chronic kidney disease) stage 3, GFR 30-59 ml/min (CMS/HCC) [N18.3] Yes   • Hypertension [I10] Yes   • Hyperlipidemia [E78.5] Yes   • Gastroesophageal reflux disease without esophagitis [K21.9] Yes   •  Diabetes mellitus (CMS/HCC) [E11.9] Yes   • Benign prostatic hyperplasia without lower urinary tract symptoms [N40.0] Yes     Acute hypoxemic respiratory failure/right pleural effusion  -CT imaging noted.  -Pulmonary consulted.  -Scheduled and PRN breathing treatments.  -Thoracentesis with fluid studies ordered.  -Wean oxygen as able to maintain saturations > 88%.  -Add IS.   -Daily weights. Strict intake and output. Check echocardiogram (not one on file) for edema, NICOLE with dizziness. Check lower extremity dopplers as well.    CAD  -Has been given outpatient referral by PCP. Will check echocardiogram. Consider cardiology consultation if symptoms arise. ProBNP okay. Troponin negative.     HTN/HLD  -Blood pressures stable.  -Restart home medications.    DM2  -Monitor AC and HS.  -Last hemoglobin A1c 6.  -Restart home insulin regimen at lower dose given reports of recent low blood sugars and decreased appetite. Treat hypoglycemia per protocol.    CKD3  -Creatinine appears stable.   -Monitor for now. Avoid nephrotoxins.    I discussed the patients findings and my recommendations with patient, family, nursing staff and Dr. Templeton..    VTE Prophylaxis - SCDs.  Code Status - Full code.        LUIS ANGEL Flores  [facility]  12/12/19  4:38 PM

## 2019-12-12 NOTE — ED PROVIDER NOTES
EMERGENCY DEPARTMENT ENCOUNTER    CHIEF COMPLAINT  Chief Complaint: SOA  History given by: pt daughter  History limited by: N/A  Room Number: 27/27  PMD: Ricardo Anderson MD      HPI:  Pt is a 74 y.o. male who presents complaining of moderate constant gradual SOA that started several weeks ago. Pt admits to productive cough with green sputum and leg swelling but denies decreased appetite, difficulty urinating, constipation, hematuria, hematochezia. Pt daughter states pt had a chest XR at his PCP's office yesterday that showed an abnormal result and states he was referred here.   Pt states he had bypass surgery two years ago.    Duration/ Onset/ Timing: several weeks/ gradual/ constant  Quality: SOA  Intensity/Severity: moderate  Progression: unchanged  Associated Symptoms: productive cough with green sputum, BLE edema  Previous Episodes: none      PAST MEDICAL HISTORY  Active Ambulatory Problems     Diagnosis Date Noted   • Diabetes mellitus (CMS/HCC) 02/26/2019   • Hypertension 02/26/2019   • Hyperlipidemia 02/26/2019   • Depression 02/26/2019   • Benign prostatic hyperplasia without lower urinary tract symptoms 02/26/2019   • Gastroesophageal reflux disease without esophagitis 02/26/2019   • Atypical chest pain 04/02/2019   • Shortness of breath on exertion 04/02/2019     Resolved Ambulatory Problems     Diagnosis Date Noted   • No Resolved Ambulatory Problems     Past Medical History:   Diagnosis Date   • Myocardial infarction (CMS/[facility])        PAST SURGICAL HISTORY  Past Surgical History:   Procedure Laterality Date   • COLONOSCOPY  10/30/2019    DR. MARQUISE DE DIOS [place], [place]   • CORONARY ARTERY BYPASS GRAFT      DONE IN [place]   • REPLACEMENT TOTAL KNEE Bilateral     DONE IN [place]       FAMILY HISTORY  Family History   Problem Relation Age of Onset   • Cancer Mother    • Diabetes Mother    • Cancer Father        SOCIAL HISTORY  Social History     Socioeconomic History    • Marital status:      Spouse name: Not on file   • Number of children: Not on file   • Years of education: Not on file   • Highest education level: Not on file   Tobacco Use   • Smoking status: Former Smoker   • Smokeless tobacco: Never Used   Substance and Sexual Activity   • Alcohol use: Yes     Comment: OCC   • Drug use: No   • Sexual activity: Yes     Partners: Female       ALLERGIES  Patient has no known allergies.    REVIEW OF SYSTEMS  Review of Systems   Constitutional: Negative for activity change, appetite change and fever.   HENT: Negative for congestion and sore throat.    Eyes: Negative.    Respiratory: Positive for cough (productive with green sputum) and shortness of breath.    Cardiovascular: Positive for leg swelling (bilateral). Negative for chest pain.   Gastrointestinal: Negative for abdominal pain, diarrhea and vomiting.   Endocrine: Negative.    Genitourinary: Negative for decreased urine volume and dysuria.   Musculoskeletal: Negative for neck pain.   Skin: Negative for rash and wound.   Allergic/Immunologic: Negative.    Neurological: Negative for weakness, numbness and headaches.   Hematological: Negative.    Psychiatric/Behavioral: Negative.    All other systems reviewed and are negative.      PHYSICAL EXAM  ED Triage Vitals   Temp Heart Rate Resp BP SpO2   12/12/19 1051 12/12/19 1051 12/12/19 1051 12/12/19 1104 12/12/19 1051   98.3 °F (36.8 °C) 89 (!) 30 179/88 (!) 81 %      Temp src Heart Rate Source Patient Position BP Location FiO2 (%)   12/12/19 1051 12/12/19 1051 -- -- --   Tympanic Monitor          Physical Exam   Constitutional: He is oriented to person, place, and time. No distress.   HENT:   Head: Normocephalic and atraumatic.   Mouth/Throat: Oropharynx is clear and moist.   Eyes: Pupils are equal, round, and reactive to light. EOM are normal. No scleral icterus.   No conjunctival pallor   Neck: Normal range of motion. Neck supple.   Cardiovascular: Normal rate, regular  rhythm and intact distal pulses.   No murmur heard.  Pulmonary/Chest: Effort normal. Tachypnea noted. No respiratory distress. He has decreased breath sounds (absent in RLF, decreased on the left diffusely) in the right lower field. He has no wheezes. He has no rhonchi. He has no rales.   Speaks in short sentences.   Abdominal: Soft. Bowel sounds are normal. There is no tenderness. There is no rebound and no guarding.   Protuberant   Musculoskeletal: Normal range of motion. He exhibits edema (2+ pedal). He exhibits no tenderness (calf).   Neurological: He is alert and oriented to person, place, and time. He has normal sensation and normal strength.   Skin: Skin is warm and dry. No rash noted. No pallor.   Chronic venous stasis changes to BLEs   Psychiatric: Mood and affect normal.   Nursing note and vitals reviewed.      LAB RESULTS  Lab Results (last 24 hours)     Procedure Component Value Units Date/Time    CBC & Differential [[id number]] Collected:  12/12/19 1109    Specimen:  Blood Updated:  12/12/19 1143    Narrative:       The following orders were created for panel order CBC & Differential.  Procedure                               Abnormality         Status                     ---------                               -----------         ------                     CBC Auto Differential[[id number]]        Abnormal            Final result                 Please view results for these tests on the individual orders.    Comprehensive Metabolic Panel [[id number]]  (Abnormal) Collected:  12/12/19 1109    Specimen:  Blood Updated:  12/12/19 1153     Glucose 133 mg/dL      BUN 20 mg/dL      Creatinine 1.37 mg/dL      Sodium 136 mmol/L      Potassium 4.1 mmol/L      Chloride 98 mmol/L      CO2 28.1 mmol/L      Calcium 9.4 mg/dL      Total Protein 7.6 g/dL      Albumin 4.10 g/dL      ALT (SGPT) 17 U/L      AST (SGOT) 22 U/L      Comment: Specimen hemolyzed.  Results may be affected.        Alkaline Phosphatase 67 U/L       Total Bilirubin 0.7 mg/dL      eGFR Non African Amer 51 mL/min/1.73      Globulin 3.5 gm/dL      A/G Ratio 1.2 g/dL      BUN/Creatinine Ratio 14.6     Anion Gap 9.9 mmol/L     Narrative:       GFR Normal >60  Chronic Kidney Disease <60  Kidney Failure <15      Protime-INR [[id number]]  (Abnormal) Collected:  12/12/19 1109    Specimen:  Blood Updated:  12/12/19 1131     Protime 15.1 Seconds      INR 1.22    Troponin [[id number]]  (Normal) Collected:  12/12/19 1109    Specimen:  Blood Updated:  12/12/19 1158     Troponin T <0.010 ng/mL     Narrative:       Troponin T Reference Range:  <= 0.03 ng/mL-   Negative for AMI  >0.03 ng/mL-     Abnormal for myocardial necrosis.  Clinicians would have to utilize clinical acumen, EKG, Troponin and serial changes to determine if it is an Acute Myocardial Infarction or myocardial injury due to an underlying chronic condition.     BNP [[id number]]  (Normal) Collected:  12/12/19 1109    Specimen:  Blood Updated:  12/12/19 1158     proBNP 394.2 pg/mL     Narrative:       Among patients with dyspnea, NT-proBNP is highly sensitive for the detection of acute congestive heart failure. In addition NT-proBNP of <300 pg/ml effectively rules out acute congestive heart failure with 99% negative predictive value.      Procalcitonin [[id number]]  (Normal) Collected:  12/12/19 1109    Specimen:  Blood Updated:  12/12/19 1158     Procalcitonin 0.14 ng/mL     Narrative:       As a Marker for Sepsis (Non-Neonates):   1. <0.5 ng/mL represents a low risk of severe sepsis and/or septic shock.  1. >2 ng/mL represents a high risk of severe sepsis and/or septic shock.    As a Marker for Lower Respiratory Tract Infections that require antibiotic therapy:  PCT on Admission     Antibiotic Therapy             6-12 Hrs later  > 0.5                Strongly Recommended            >0.25 - <0.5         Recommended  0.1 - 0.25           Discouraged                   Remeasure/reassess PCT  <0.1                  "Strongly Discouraged          Remeasure/reassess PCT      As 28 day mortality risk marker: \"Change in Procalcitonin Result\" (> 80 % or <=80 %) if Day 0 (or Day 1) and Day 4 values are available. Refer to http://www.[facility]-pct-calculator.com/   Change in PCT <=80 %   A decrease of PCT levels below or equal to 80 % defines a positive change in PCT test result representing a higher risk for 28-day all-cause mortality of patients diagnosed with severe sepsis or septic shock.  Change in PCT > 80 %   A decrease of PCT levels of more than 80 % defines a negative change in PCT result representing a lower risk for 28-day all-cause mortality of patients diagnosed with severe sepsis or septic shock.                  CBC Auto Differential [[id number]]  (Abnormal) Collected:  12/12/19 1109    Specimen:  Blood Updated:  12/12/19 1143     WBC 5.46 10*3/mm3      RBC 4.83 10*6/mm3      Hemoglobin 13.1 g/dL      Hematocrit 42.1 %      MCV 87.2 fL      MCH 27.1 pg      MCHC 31.1 g/dL      RDW 14.6 %      RDW-SD 47.0 fl      MPV 10.1 fL      Platelets 141 10*3/mm3      Neutrophil % 87.6 %      Lymphocyte % 7.3 %      Monocyte % 4.2 %      Eosinophil % 0.2 %      Basophil % 0.2 %      Immature Grans % 0.5 %      Neutrophils, Absolute 4.78 10*3/mm3      Lymphocytes, Absolute 0.40 10*3/mm3      Monocytes, Absolute 0.23 10*3/mm3      Eosinophils, Absolute 0.01 10*3/mm3      Basophils, Absolute 0.01 10*3/mm3      Immature Grans, Absolute 0.03 10*3/mm3      nRBC 0.0 /100 WBC     Blood Culture - Blood, Arm, Right [[id number]] Collected:  12/12/19 1141    Specimen:  Blood from Arm, Right Updated:  12/12/19 1155    Lactic Acid, Plasma [[id number]]  (Normal) Collected:  12/12/19 1141    Specimen:  Blood Updated:  12/12/19 1212     Lactate 1.8 mmol/L     Blood Culture - Blood, Arm, Left [[id number]] Collected:  12/12/19 1149    Specimen:  Blood from Arm, Left Updated:  12/12/19 1155          I ordered the above labs and reviewed the " results    RADIOLOGY  CT Chest With Contrast    (Results Pending)        I ordered the above noted radiological studies. Interpreted by radiologist. Discussed with radiologist (Dr Bass). Reviewed by me in PACS.       PROCEDURES  Procedures    EKG           EKG time: 1132  Rhythm/Rate: sinus 75  P waves and CO: ADRIENNE within normal limits  QRS, axis: Narrow QRS complex,   ST and T waves: slight ST depression V3, V4, V5 less than a mm    No STEMI  QTC is within normal limits    Interpreted Contemporaneously by me, independently viewed  Comparison: 4/2/19, similar slight ST depression        PROGRESS AND CONSULTS       1110  Pt was 81% on room air on arrival to ED and states he does not normally need oxygen at home.   Discussed plan to do labs and EKG. Pt understands and agrees with the plan. All questions have been answered.    1256  Rechecked patient who is resting comfortbly.  HR - 72, O2 - 96%.  Discussed all lab and test results. Discussed plan to view chest CT results. Pt understands and agrees with the plan. All questions have been answered.    1355  Rechecked patient who is resting.  HR - 71, O2 - 98%.  Discussed all lab and test results, specifically mass seen on CT. Discussed plan to admit the pt. Pt understands and agrees with the plan. All questions have been answered.    1415  Discussed case with LUIS ANGEL Mojica for Dr Templeton, GORDY  Reviewed history, exam, results and treatments.  Discussed concerns and plan of care. Dr Templeton accepts pt to be admitted to telemetry.      MEDICAL DECISION MAKING  Results were reviewed/discussed with the patient and they were also made aware of online access. Pt also made aware that some labs, such as cultures, will not be resulted during ER visit and follow up with PMD is necessary.     MDM  Number of Diagnoses or Management Options  Acute hypoxemic respiratory failure (CMS/HCC):   Left adrenal mass (CMS/HCC):   Pleural effusion on right:   Right lower lobe lung mass:       Amount and/or Complexity of Data Reviewed  Clinical lab tests: ordered and reviewed (INR 1.22, negative troponin)  Tests in the radiology section of CPT®: ordered and reviewed (Chest CT - RLL collapse secondary to large effusion with concern for a mass there. 4cm left adrenal noduel that was noted on prior CTA of chest from 05/2019)  Tests in the medicine section of CPT®: ordered and reviewed (Refer to the procedure section of the note for EKG results)  Discussion of test results with the performing providers: yes (Dr Bass)  Decide to obtain previous medical records or to obtain history from someone other than the patient: yes (Epic)  Obtain history from someone other than the patient: yes (Pt daughter)  Review and summarize past medical records: yes (Chest XR from yesterday shows a moderately sized right pleural effusion)  Discuss the patient with other providers: yes (Galina PLASENCIA)           DIAGNOSIS  Final diagnoses:   Acute hypoxemic respiratory failure (CMS/HCC)   Pleural effusion on right   Right lower lobe lung mass   Left adrenal mass (CMS/HCC)       DISPOSITION  ADMISSION    Discussed treatment plan and reason for admission with pt/family and admitting physician.  Pt/family voiced understanding of the plan for admission for further testing/treatment as needed.         Latest Documented Vital Signs:  As of 2:18 PM  BP- 163/87 HR- 68 Temp- 98.3 °F (36.8 °C) (Tympanic) O2 sat- 97%    --  Documentation assistance provided by isamar Ohara for Dr Hogan.  Information recorded by the scribe was done at my direction and has been verified and validated by me.          Valerie Ohara  12/12/19 7010       Chavez Hogan MD  12/12/19 1555

## 2019-12-12 NOTE — CONSULTS
[facility]  Phone: [phone]  Ricardo Talavera MD      Subjective   LOS: 0 days     74-year-old male who was here in April for shortness of breath and cough and wheezing.  He had a CT chest which showed a pleural-based right lower lobe mass.  He was advised follow-up though I do not see that happen.  He was seen by his doctor's office yesterday for shortness of breath and had a chest x-ray showing large right effusion.  Subsequent CT confirmed and patient was asked to come to the ED.  We have been asked to see him for the same.  He states he has shortness of breath wheezing cough all the time.  He reports some chronic lower extremity edema.  He denies chest pain.  Previous quadruple bypass.  He states he quit smoking many years ago but used to smoke 3 packets of cigarettes a day.  He is not very specific about quantification.  He has a history of diabetes and hypertension.  He denies kidney problems or strokes.  He has some snoring at night but does not have any formal diagnosis of sleep apnea.  He states he gets short of breath and dizzy if he coughs too much as well.  He does not have any echo in our system.    Pablo Collier  reports that he drinks alcohol.,  reports that he quit smoking about 2 years ago. He quit after 37.00 years of use. He has never used smokeless tobacco.     Past Hx:  has a past medical history of Diabetes mellitus (CMS/[facility]), Hyperlipidemia, Hypertension, and Myocardial infarction (CMS/[facility]).  Surg Hx:  has a past surgical history that includes Replacement total knee (Bilateral); Coronary artery bypass graft; and Colonoscopy (10/30/2019).  FH: family history includes Cancer in his father and mother; Diabetes in his mother.  SH:  reports that he quit smoking about 2 years ago. He quit after 37.00 years of use. He has never used smokeless tobacco. He reports that he drinks alcohol. He reports that he does not use drugs.    Medications Prior to Admission   Medication Sig Dispense  Refill Last Dose   • albuterol sulfate  (90 Base) MCG/ACT inhaler Inhale 2 puffs Every 4 (Four) Hours As Needed for Wheezing or Shortness of Air. 1 inhaler 11 12/11/2019 at Unknown time   • amLODIPine (NORVASC) 10 MG tablet TAKE 1 TABLET BY MOUTH DAILY. 30 tablet 6 12/11/2019 at Unknown time   • aspirin 81 MG EC tablet Take 81 mg by mouth Daily.   12/11/2019 at Unknown time   • B Complex-C (SUPER B COMPLEX PO) Take 1 tablet by mouth Daily.   12/11/2019 at Unknown time   • cholecalciferol (VITAMIN D3) 1000 units tablet Take 1,000 Units by mouth Daily.   12/11/2019 at Unknown time   • citalopram (CeleXA) 20 MG tablet Take 1 tablet by mouth Daily. 30 tablet 9 12/11/2019 at Unknown time   • Docusate Calcium (STOOL SOFTENER PO) Take 1 capsule by mouth 2 (Two) Times a Day.   12/11/2019 at Unknown time   • glucose blood (ACCU-CHEK GUNNER) test strip USE TO TEST BLOOD SUGARS THREE TIMES DAILY AS DIRECTED BY PRESCRIBER DX: E11.00 100 each 12 12/11/2019 at Unknown time   • insulin detemir (LEVEMIR FLEXTOUCH) 100 UNIT/ML injection Inject 50 Units under the skin into the appropriate area as directed Every Night. 15 mL 11 12/11/2019 at Unknown time   • Insulin Pen Needle (NOVOFINE) 32G X 6 MM misc Use with insulin 90 each 3 12/11/2019 at Unknown time   • losartan-hydrochlorothiazide (HYZAAR) 50-12.5 MG per tablet TAKE 1 TABLET BY MOUTH DAILY. 30 tablet 6 12/11/2019 at Unknown time   • metoprolol tartrate (LOPRESSOR) 25 MG tablet TAKE 1 TABLET BY MOUTH 2 (TWO) TIMES A DAY. 60 tablet 6 12/11/2019 at Unknown time   • Multiple Vitamin (MULTI-VITAMIN DAILY PO) Take 1 tablet by mouth Daily.   12/11/2019 at Unknown time   • NOVOLOG FLEXPEN 100 UNIT/ML solution pen-injector sc pen INJECT 14 UNITS BEFORE BREAKFAST AND 12 UNITS BEFORE LUNCH AND SUPPER. PATIENT NEEDS TO BE SEEN BEFORE NEXT REFILL 15 mL 0 12/11/2019 at Unknown time   • pantoprazole (PROTONIX) 40 MG EC tablet TAKE 1 TABLET BY MOUTH DAILY. 90 tablet 1 12/11/2019 at  Unknown time   • pioglitazone (ACTOS) 30 MG tablet Take 1 tablet by mouth Daily. 30 tablet 6 2019 at Unknown time   • pravastatin (PRAVACHOL) 40 MG tablet Take 1 tablet by mouth every night at bedtime. 30 tablet 6 2019 at Unknown time   • predniSONE (DELTASONE) 20 MG tablet Take 2 tablets by mouth Daily for 5 days. 10 tablet 0 2019 at Unknown time   • tamsulosin (FLOMAX) 0.4 MG capsule 24 hr capsule TAKE 1 CAPSULE BY MOUTH DAILY. DX=N40.0 30 capsule 0 2019 at Unknown time   • TRELEGY ELLIPTA 100-62.5-25 MCG/INH aerosol powder  Inhale 1 each Daily. 1 each 11 2019 at Unknown time     No Known Allergies    Review of Systems   Constitutional: Positive for chills. Negative for fever.   HENT: Negative for congestion, postnasal drip, sore throat and trouble swallowing.    Eyes: Negative for redness and visual disturbance.   Respiratory: Positive for shortness of breath. Negative for cough and wheezing.    Cardiovascular: Positive for leg swelling. Negative for chest pain and palpitations.   Gastrointestinal: Negative for abdominal pain, diarrhea, nausea and vomiting.   Endocrine: Negative for cold intolerance and heat intolerance.   Genitourinary: Negative for frequency and urgency.   Musculoskeletal: Negative for arthralgias and back pain.   Skin: Negative for pallor and rash.   Neurological: Positive for dizziness. Negative for seizures and headaches.   Psychiatric/Behavioral: Negative for behavioral problems. The patient is not nervous/anxious.      Vital Signs past 24hrs  BP range: BP: (163-179)/(80-88) 163/80  Pulse range: Heart Rate:  [68-89] 68  Resp rate range: Resp:  [26-30] 28  Temp range: Temp (24hrs), Av.3 °F (36.8 °C), Min:98.3 °F (36.8 °C), Max:98.3 °F (36.8 °C)    Oxygen range: SpO2:  [81 %-97 %] 97 %; Flow (L/min):  [2-3] 2;   Device (Oxygen Therapy): nasal cannula  (!) 145 kg (319 lb); Body mass index is 42.09 kg/m².  I/O this shift:  In: 600 [P.O.:600]  Out: -     Adult  male who is somewhat unkempt and looks older than stated age.  Pupils equal and react light.  Oropharynx class IV Mallampati airway no posterior pharyngeal discharge.  Nasopharynx without discharge septum midline.  JVP unable to assess.  Neck is large.  Trachea midline thyroid not enlarged.  Lungs reveal bilateral air entry.  Absent breath sounds right lung base.  Percussion note dull in the right lung base.  No obvious wheezing but breath sounds are somewhat coarse.  Overall breath sounds are diminished and could be due to elevated BMI or underlying COPD.  Chest expansion is unequal with decreased expansion on the right side.  No chest wall deformity or tenderness.  Heart examination S1-S2 present rhythm regular no murmurs.  Chronic appearing 1+ edema lower extremities.  Abdomen is morbidly obese, soft, nontender.  Bowel sounds present no liver spleen enlargement.  No peripheral cyanosis clubbing.  Moves all 4 extremities sensorimotor intact.  No obvious cervical, axillary, inguinal adenopathy.    Results Review:    I have reviewed the laboratory and imaging data from current admission. My annotations are as noted in assessment and plan.    Medication Review:  I have reviewed the current MAR. My annotations are as noted in assessment and plan.    Plan   PCCM Problems  Large right pleural effusion  Right lower lobe mass  COPD without obvious exacerbation  Acute versus chronic hypoxia  Chronic leg edema  Ex-smoker  Relevant Medical Diagnoses  CAD with history of bypass  Suspected obstructive sleep apnea  Diabetes mellitus  CKD    Plan of Treatment  I think the most appropriate first step is to tap the right effusion.  It looks like this is already been ordered when I go to order this in the computer.    There was a right lower lobe pleural-based mass on the CT chest done in April 2019.  Current CT suggests obstructing mass in the right lower lobe.  I would like to review with the radiologist as it would be more  likely for the pleural-based mass to have increased in size.  If this is the case the most appropriate way of approaching this would be CT-guided biopsy.  Again will review with interventional radiologist and decide on bronchoscopy versus CT-guided biopsy.    COPD without obvious exacerbation.  Does not need Trelegy inhaler here.  Continue duo nebs 4 times daily.  No need for systemic steroids at this point.    Patient gets dizzy when he coughs etc.  I suspect cor pulmonale.  Echo is pending.  Will review same when available.    Chronic leg edema could be from congestive heart failure or from cor pulmonale.  Again await echo report.    I suspect he has sleep apnea.  Based on further clinical decisions he may wish to get an outpatient sleep study.    Spoke with his wife at bedside.    Part of this note may be an electronic transcription/translation of spoken language to printed text using the Dragon Dictation System.

## 2019-12-13 NOTE — PROGRESS NOTES
[facility]  [phone]  Ricardo Talavera MD    Subjective:  LOS: 1    He denies much change.  Feels okay at rest.  Denies much cough or phlegm today.  No nausea vomiting diarrhea.    Objective   Vital Signs past 24hrs    Temp range: Temp (24hrs), Av.9 °F (36.6 °C), Min:97.6 °F (36.4 °C), Max:98.3 °F (36.8 °C)    BP range: BP: (137-179)/(78-91) 142/78  Pulse range: Heart Rate:  [55-89] 58  Resp rate range: Resp:  [20-30] 20    Device (Oxygen Therapy): nasal cannulaFlow (L/min):  [2-3] 2  Oxygen range:SpO2:  [81 %-97 %] 96 %      (!) 145 kg (319 lb); Body mass index is 42.09 kg/m².    Intake/Output Summary (Last 24 hours) at 2019 1040  Last data filed at 2019 0800  Gross per 24 hour   Intake 820 ml   Output --   Net 820 ml       Physical Exam   Constitutional: He appears well-developed.   HENT:   Head: Normocephalic.   Eyes: Pupils are equal, round, and reactive to light.   Cardiovascular: Normal rate and regular rhythm.   No murmur heard.  Pulmonary/Chest: He has decreased breath sounds (overall). He has no wheezes. He has no rales.   Absent breath sounds RLL and dull to percuss   Abdominal: Soft. Bowel sounds are normal. He exhibits no distension (obese). There is no tenderness.   Musculoskeletal: He exhibits edema.   Neurological: He is alert.   Nursing note and vitals reviewed.    Results Review:    I have reviewed the laboratory and imaging data since the last note by [facility] physician.  My annotations are noted in assessment and plan.    Medication Review:  I have reviewed the current MAR.  My annotations are noted in assessment and plan.      hold 1 each     Plan   PCCM Problems  Large right pleural effusion  Right lower lobe mass  COPD without obvious exacerbation  Acute versus chronic hypoxia  Chronic leg edema  Ex-smoker  Relevant Medical Diagnoses  CAD with history of bypass  Suspected obstructive sleep apnea  Diabetes mellitus  CKD      Plan of Treatment  Await tap of right pleural  effusion.  I will speak with IR regarding the right lower lobe mass.  It looks like patient is already scheduled for tap and biopsy.  Will await results of path.    No obvious exacerbation of COPD.  Requires long-term bronchodilators.    I suspect his hypoxia is chronic as much is acute.  He may require oxygen on discharge.    He has chronic leg edema and may benefit from additional diuretics.  Cor pulmonale is also likely.    Suspected sleep apnea and should be tested outpatient with consideration of CPAP device.    Ricardo Talavera MD  12/13/19  10:40 AM     Addendum:    I spoke with interventional radiology.  Their impression is that the right pleural effusion should be tapped first.  Thereafter contrasted CT on Monday and thereafter determine need for additional CT-guided biopsy versus bronc directed biopsy of any mass that may still be present.  They are not certain that there is a underlying mass versus atelectasis versus consolidation.    Part of this note may be an electronic transcription/translation of spoken language to printed text using the Dragon Dictation System.

## 2019-12-13 NOTE — PROGRESS NOTES
Name: Pablo Collier ADMIT: 2019   : 1945  PCP: Ricardo Anderson MD    MRN: [id number] LOS: 1 days   AGE/SEX: 74 y.o. male  ROOM: [facility]   Subjective   Chief Complaint   Patient presents with   • Shortness of Breath     Pt had xray done at PCP's office yesterday and was called today and told it was abnormal. Complains of SOA.   dyspnea better  Had thoracentesis with 2500cc fluid removed  Had echo  On diuretics    ROS  No f/c  No n/v  No cp/palp  +soa/cough    Objective   Vital Signs  Temp:  [97.6 °F (36.4 °C)-98.3 °F (36.8 °C)] 97.7 °F (36.5 °C)  Heart Rate:  [55-68] 65  Resp:  [16-28] 16  BP: (114-165)/(67-91) 114/67  SpO2:  [92 %-97 %] 92 %  on  Flow (L/min):  [2] 2;   Device (Oxygen Therapy): room air  Body mass index is 42.09 kg/m².    Physical Exam   Constitutional: He is oriented to person, place, and time. He appears well-developed and well-nourished. No distress.   HENT:   Head: Normocephalic and atraumatic.   Mouth/Throat: Oropharynx is clear and moist.   Eyes: Conjunctivae are normal. No scleral icterus.   Neck: Normal range of motion. Neck supple.   Cardiovascular: Normal rate, regular rhythm and normal heart sounds.   No murmur heard.  Pulmonary/Chest: Effort normal and breath sounds normal. No respiratory distress.   Decreased bs on right   Abdominal: Soft. Bowel sounds are normal. There is no tenderness.   Musculoskeletal: He exhibits edema (2+ LE). He exhibits no deformity.   Neurological: He is alert and oriented to person, place, and time.   Skin: Skin is warm and dry. He is not diaphoretic.   Psychiatric: He has a normal mood and affect. His behavior is normal. Thought content normal.   Nursing note and vitals reviewed.      Results Review:       I reviewed the patient's new clinical results.  Results from last 7 days   Lab Units 19  0604 19  1109 19  1353   WBC 10*3/mm3 4.68 5.46 6.86   HEMOGLOBIN g/dL 12.4* 13.1 13.5   PLATELETS 10*3/mm3 107* 141 145     Results  from last 7 days   Lab Units 12/13/19  0604 12/12/19  1109 12/11/19  1353   SODIUM mmol/L 140 141  136 137   POTASSIUM mmol/L 3.9 4.3  4.1 4.0   CHLORIDE mmol/L 100 98  98 96*   CO2 mmol/L 30.7* 28.3  28.1 32.7*   BUN mg/dL 16 20  20 20   CREATININE mg/dL 1.25 1.43*  1.37* 1.31*   GLUCOSE mg/dL 116* 134*  133* 58*   Estimated Creatinine Clearance: 78.9 mL/min (by C-G formula based on SCr of 1.25 mg/dL).  Results from last 7 days   Lab Units 12/13/19  0604 12/12/19  1109 12/11/19  1353   ALBUMIN g/dL 3.60 4.10 4.20   BILIRUBIN mg/dL 0.6 0.7 0.8   ALK PHOS U/L 60 67 70   AST (SGOT) U/L 20 22 19   ALT (SGPT) U/L 18 17 19     Results from last 7 days   Lab Units 12/13/19  0604 12/12/19  1109 12/11/19  1353   CALCIUM mg/dL 8.9 9.0  9.4 9.4   ALBUMIN g/dL 3.60 4.10 4.20     Results from last 7 days   Lab Units 12/12/19  1141 12/12/19  1109   PROCALCITONIN ng/mL  --  0.14   LACTATE mmol/L 1.8  --        Coag   Results from last 7 days   Lab Units 12/13/19  0604 12/12/19  1109   INR  1.21* 1.22*   APTT seconds 31.6  --      HbA1C   Lab Results   Component Value Date    HGBA1C 6.00 (H) 12/11/2019    HGBA1C 6.39 (H) 02/26/2019     Infection Results from last 7 days   Lab Units 12/12/19  1149 12/12/19  1141 12/12/19  1109   BLOODCX  No growth at 24 hours No growth at 24 hours  --    PROCALCITONIN ng/mL  --   --  0.14     Radiology(recent) Ct Chest With Contrast    Result Date: 12/13/2019  1. The right lower lobe is completely collapsed and an obstructing mass is suspected. There is a moderately large right pleural effusion which is partially loculated and there is also mild pleural thickening along the posterior aspect of the right lower thorax as well as at the lateral aspect of the right middle lobe. 2. There is stable chronic-appearing scarring and volume loss at the left lower lobe and there is also stable mild pleural thickening at the posterior aspect of the left thorax. 3. Stable approximately 4 cm left adrenal  nodule.  This report was finalized on 12/13/2019 11:03 AM by Dr. Vijaya Bass M.D.      Us Thoracentesis    Result Date: 12/13/2019  Ultrasound-guided right thoracentesis as described.  This report was finalized on 12/13/2019 2:14 PM by Dr. Saleem Crowell M.D.      Troponin T   Date Value Ref Range Status   12/12/2019 <0.010 0.000 - 0.030 ng/mL Final     No components found for: TSH;2      amLODIPine 10 mg Oral Daily   aspirin 81 mg Oral Daily   citalopram 20 mg Oral Daily   docusate sodium 100 mg Oral BID   furosemide 40 mg Oral Daily   insulin glargine 15 Units Subcutaneous Nightly   insulin lispro 0-14 Units Subcutaneous 4x Daily With Meals & Nightly   ipratropium-albuterol 3 mL Nebulization 4x Daily - RT   losartan 50 mg Oral Q24H   metoprolol tartrate 25 mg Oral BID   pantoprazole 40 mg Oral QAM   [COMPLETED] perflutren (DEFINITY) in 0.9% NS 10 mL syringe 2 mL Intravenous Once in imaging   pravastatin 40 mg Oral Nightly   sodium chloride 10 mL Intravenous Q12H   tamsulosin 0.4 mg Oral Daily       hold 1 each   Diet Regular; Cardiac, Consistent Carbohydrate, Renal      Assessment/Plan      Active Hospital Problems    Diagnosis  POA   • Acute hypoxemic respiratory failure (CMS/HCC) [J96.01]  Yes   • Pleural effusion, right [J90]  Yes   • CAD (coronary artery disease) [I25.10]  Yes   • CKD (chronic kidney disease) stage 3, GFR 30-59 ml/min (CMS/HCC) [N18.3]  Yes   • Lung mass [R91.8]  Yes   • Hypertension [I10]  Yes   • Hyperlipidemia [E78.5]  Yes   • Gastroesophageal reflux disease without esophagitis [K21.9]  Yes   • Diabetes mellitus (CMS/HCC) [E11.9]  Yes   • Benign prostatic hyperplasia without lower urinary tract symptoms [N40.0]  Yes      Resolved Hospital Problems   No resolved problems to display.       · S/p thoracentesis, follow studies. Then to have contrasted CT on Monday to determine bronch vs CT guided biopsy.   · Diuretics, monitor renal fx and volume status. ECHO today  · Insulin- have on less  than home dosage with low A1C and hypoglycemia. -131 currently  · Above meds      MAGNOLIA RN  DW family    Robles Templeton MD  [facility]  12/13/19  3:10 PM

## 2019-12-13 NOTE — PROGRESS NOTES
Discharge Planning Assessment  [facility]     Patient Name: Pablo Collier  MRN: [id number]  Today's Date: 12/13/2019    Admit Date: 12/12/2019    Discharge Needs Assessment     Row Name 12/13/19 0841       Living Environment    Lives With  child(og), adult    Current Living Arrangements  home/apartment/condo    Primary Care Provided by  self;child(og)    Provides Primary Care For  no one, unable/limited ability to care for self    Family Caregiver if Needed  child(og), adult    Quality of Family Relationships  helpful;involved;supportive    Able to Return to Prior Arrangements  yes       Transition Planning    Patient/Family Anticipates Transition to  home with family    Transportation Anticipated  family or friend will provide       Discharge Needs Assessment    Readmission Within the Last 30 Days  no previous admission in last 30 days    Equipment Currently Used at Home  cane, straight;rollator has a CPAP but does not use    Provided post acute provider list?  Yes    Post Acute Provider Lists  Home Health    Post Acuite Provider List  Delivered    Delivered To  Patient;Support Person    Method of Delivery  In person        Discharge Plan     Row Name 12/13/19 0846       Plan    Plan  Plan at present is to return home with his dtr upon DC.  CCP will follow and assist as needed.     Plan Comments  Spoke with pt and his dtr Chelsey at bedside.  Facesheet and pharmacy info confirmed.  Pt lives in a first floor apartment with his dtr.  He ambulates with a rollator walker.  He also has a CPAP, but does not use. He does not drive.  Chelsey provides transport as needed, but she works full time.  So he is alone at home during the day and sometimes has trouble finding transport to MD appts.  Transportation resources list given.  He manages his own meds, his local pharmacy is [facility] on [address]   Chelsey assists at home with household chores.  Pt has never had HH and has been to SNF, but not in [place].   CCP  discussed DC options including HH.  Pt is not sure of DC needs at this time.  List of HH/ SNF providers given in case needed.                Demographic Summary     Row Name 12/13/19 0813       General Information    Admission Type  inpatient    Arrived From  home    Referral Source  admission list    Reason for Consult  discharge planning    Preferred Language  English        Functional Status     Row Name 12/13/19 0841       Functional Status    Usual Activity Tolerance  moderate       Functional Status, IADL    Medications  independent    Meal Preparation  assistive person    Housekeeping  assistive person    Laundry  assistive person    Shopping  assistive person       Employment/    Employment Status  retired          Patient Forms     Row Name 12/13/19 0810       Patient Forms    Provider Choice List  Delivered    Delivered to  Patient    Method of delivery  In person            Lexie Lopez RN

## 2019-12-13 NOTE — NURSING NOTE
Returned from ultrasound. Dressing to mid right back dry and intact. No SOA. No complaints of chest pain. No distress noted.Awaiting transport.

## 2019-12-13 NOTE — PLAN OF CARE
New admit, Doppler of BLE neg, ECHO, CT needle biopsy of right lung mass, right Thoracentesis, scheduled, on 3L O2 new, RR 24-30, pt states not in distress, family at bedside, will CTM

## 2019-12-13 NOTE — PLAN OF CARE
Problem: Patient Care Overview  Goal: Plan of Care Review  Outcome: Ongoing (interventions implemented as appropriate)  Flowsheets  Taken 12/13/2019 1707  Progress: improving  Outcome Summary: Pt had US thoracentsis today with 2.5 liters of removal. EF 75%; Edema in lower ext have decreased in the legs from +2 to trace.  SCD's on patient while in bed.  When patient arrived back from thoracentesis his breathing was unlabored, regular, no complaints of SOA, no use of abdmen muscles.  Patient has been weaned down to one liter of O2 at this time is stable with that.  Will continue to monitor  Taken 12/13/2019 0803  Plan of Care Reviewed With: patient;daughter  Goal: Individualization and Mutuality  Outcome: Ongoing (interventions implemented as appropriate)  Flowsheets (Taken 12/12/2019 1722)  What Anxieties, Fears, Concerns, or Questions Do You Have About Your Care?: to get help with getting to doctor appt after discharge  Goal: Interprofessional Rounds/Family Conf  Outcome: Ongoing (interventions implemented as appropriate)  Flowsheets (Taken 12/13/2019 1707)  Participants: ; nursing; physician     Problem: Fall Risk (Adult)  Goal: Identify Related Risk Factors and Signs and Symptoms  Outcome: Ongoing (interventions implemented as appropriate)  Flowsheets (Taken 12/13/2019 1707)  Related Risk Factors (Fall Risk): gait/mobility problems; history of falls; fatigue/slow reaction  Signs and Symptoms (Fall Risk): presence of risk factors  Goal: Absence of Fall  Outcome: Ongoing (interventions implemented as appropriate)  Flowsheets (Taken 12/13/2019 1707)  Absence of Fall: making progress toward outcome     Problem: Diabetes, Type 2 (Adult)  Goal: Signs and Symptoms of Listed Potential Problems Will be Absent, Minimized or Managed (Diabetes, Type 2)  Outcome: Ongoing (interventions implemented as appropriate)  Flowsheets (Taken 12/13/2019 1707)  Problems Assessed (Type 2 Diabetes): hyperglycemia;  hypoglycemia  Problems Present (Type 2 Diabetes): hyperglycemia; hypoglycemia     Problem: Kidney Disease, Chronic/End Stage Renal Disease (Adult)  Goal: Signs and Symptoms of Listed Potential Problems Will be Absent, Minimized or Managed (Kidney Disease, Chronic/End Stage Renal Disease)  Outcome: Ongoing (interventions implemented as appropriate)  Flowsheets (Taken 12/13/2019 1707)  Problems Assessed (Chronic Kidney Disease/ESRD): cardiovascular disease; hypertension  Problems Present (Chronic Kidney/ESRD): cardiovascular disease; hypertension     Problem: Breathing Pattern Ineffective (Adult)  Goal: Identify Related Risk Factors and Signs and Symptoms  Outcome: Ongoing (interventions implemented as appropriate)  Flowsheets (Taken 12/13/2019 1707)  Related Risk Factors (Breathing Pattern Ineffective): obesity; underlying condition  Goal: Effective Oxygenation/Ventilation  Outcome: Ongoing (interventions implemented as appropriate)  Flowsheets (Taken 12/13/2019 1707)  Effective Oxygenation/Ventilation: making progress toward outcome  Goal: Anxiety/Fear Reduction  Outcome: Ongoing (interventions implemented as appropriate)  Flowsheets (Taken 12/13/2019 1707)  Anxiety/Fear Reduction: making progress toward outcome     Problem: Skin Injury Risk (Adult)  Goal: Identify Related Risk Factors and Signs and Symptoms  Outcome: Ongoing (interventions implemented as appropriate)  Flowsheets (Taken 12/13/2019 1707)  Related Risk Factors (Skin Injury Risk): edema; tissue perfusion altered  Goal: Skin Health and Integrity  Outcome: Ongoing (interventions implemented as appropriate)  Flowsheets (Taken 12/13/2019 1707)  Skin Health and Integrity: making progress toward outcome

## 2019-12-14 NOTE — PLAN OF CARE
Problem: Patient Care Overview  Goal: Plan of Care Review  Outcome: Ongoing (interventions implemented as appropriate)  Flowsheets (Taken 12/13/2019 0803 by Malissa Al RN)  Plan of Care Reviewed With: patient;daughter  Note:   Maintaining sat on 1LPM via NC. Awaiting results of thoracentesis labs. CT with contrast to be done Monday to determine further plan of care.

## 2019-12-14 NOTE — PROGRESS NOTES
Name: Pablo Collier ADMIT: 2019   : 1945  PCP: Ricardo Anderson MD    MRN: [id number] LOS: 2 days   AGE/SEX: 74 y.o. male  ROOM: 02/   Subjective   Chief Complaint   Patient presents with   • Shortness of Breath     Pt had xray done at PCP's office yesterday and was called today and told it was abnormal. Complains of SOA.     Shortness of breath improved. Greater than 50% of time spent in counseling and/or coordination of care. Total face/floor time 35 minutes.     Review of Systems   Constitutional: Negative for chills and fever.   Respiratory: Positive for shortness of breath.    Cardiovascular: Negative for chest pain.   Gastrointestinal: Negative for diarrhea, nausea and vomiting.   Genitourinary: Negative for dysuria.     Objective   Vital Signs  Temp:  [98.2 °F (36.8 °C)-100.3 °F (37.9 °C)] 98.2 °F (36.8 °C)  Heart Rate:  [56-67] 64  Resp:  [16-20] 18  BP: (109-169)/(55-84) 169/84  SpO2:  [90 %-96 %] 93 %  on  Flow (L/min):  [1-2] 1;   Device (Oxygen Therapy): nasal cannula  Body mass index is 37.72 kg/m².    Physical Exam   Constitutional: He is oriented to person, place, and time. He appears well-developed and well-nourished. No distress.   HENT:   Head: Normocephalic and atraumatic.   Mouth/Throat: Oropharynx is clear and moist.   Eyes: Conjunctivae are normal. No scleral icterus.   Neck: Normal range of motion. Neck supple.   Cardiovascular: Normal rate, regular rhythm and normal heart sounds.   No murmur heard.  Pulmonary/Chest: Effort normal. No respiratory distress. He has decreased breath sounds.   Abdominal: Soft. Bowel sounds are normal. There is no tenderness.   Musculoskeletal: He exhibits edema (trace). He exhibits no deformity.   Neurological: He is alert and oriented to person, place, and time.   Skin: Skin is warm and dry. He is not diaphoretic.   Psychiatric: He has a normal mood and affect. His behavior is normal. Thought content normal.   Nursing note and vitals  reviewed.    Results Review:       I reviewed the patient's new clinical results.  Results from last 7 days   Lab Units 12/13/19  0604 12/12/19  1109 12/11/19  1353   WBC 10*3/mm3 4.68 5.46 6.86   HEMOGLOBIN g/dL 12.4* 13.1 13.5   PLATELETS 10*3/mm3 107* 141 145     Results from last 7 days   Lab Units 12/14/19  0536 12/13/19  0604 12/12/19  1109 12/11/19  1353   SODIUM mmol/L 139 140 141  136 137   POTASSIUM mmol/L 3.8 3.9 4.3  4.1 4.0   CHLORIDE mmol/L 98 100 98  98 96*   CO2 mmol/L 31.7* 30.7* 28.3  28.1 32.7*   BUN mg/dL 20 16 20  20 20   CREATININE mg/dL 1.29* 1.25 1.43*  1.37* 1.31*   GLUCOSE mg/dL 143* 116* 134*  133* 58*   Estimated Creatinine Clearance: 72.1 mL/min (A) (by C-G formula based on SCr of 1.29 mg/dL (H)).  Results from last 7 days   Lab Units 12/13/19  0604 12/12/19  1109 12/11/19  1353   ALBUMIN g/dL 3.60 4.10 4.20   BILIRUBIN mg/dL 0.6 0.7 0.8   ALK PHOS U/L 60 67 70   AST (SGOT) U/L 20 22 19   ALT (SGPT) U/L 18 17 19     Results from last 7 days   Lab Units 12/14/19  0536 12/13/19  0604 12/12/19  1109 12/11/19  1353   CALCIUM mg/dL 8.5* 8.9 9.0  9.4 9.4   ALBUMIN g/dL  --  3.60 4.10 4.20     Results from last 7 days   Lab Units 12/12/19  1141 12/12/19  1109   PROCALCITONIN ng/mL  --  0.14   LACTATE mmol/L 1.8  --        Coag   Results from last 7 days   Lab Units 12/13/19  0604 12/12/19  1109   INR  1.21* 1.22*   APTT seconds 31.6  --      Lab Results   Component Value Date    HGBA1C 6.00 (H) 12/11/2019    HGBA1C 6.39 (H) 02/26/2019     Infection   Results from last 7 days   Lab Units 12/13/19  1310 12/12/19  1149 12/12/19  1141 12/12/19  1109   BLOODCX   --  No growth at 24 hours No growth at 24 hours  --    BODYFLDCX  No growth  --   --   --    PROCALCITONIN ng/mL  --   --   --  0.14     amLODIPine 10 mg Oral Daily   aspirin 81 mg Oral Daily   citalopram 20 mg Oral Daily   docusate sodium 100 mg Oral BID   furosemide 40 mg Oral Daily   insulin glargine 15 Units Subcutaneous Nightly    insulin lispro 0-14 Units Subcutaneous 4x Daily With Meals & Nightly   ipratropium-albuterol 3 mL Nebulization 4x Daily - RT   losartan 50 mg Oral Q24H   metoprolol tartrate 25 mg Oral BID   pantoprazole 40 mg Oral QAM   pravastatin 40 mg Oral Nightly   sodium chloride 10 mL Intravenous Q12H   tamsulosin 0.4 mg Oral Daily       hold 1 each   Diet Regular; Cardiac, Consistent Carbohydrate, Renal    I personally reviewed CT scan    Results for orders placed during the hospital encounter of 12/12/19   Adult Transthoracic Echo Complete W/ Cont if Necessary Per Protocol    Narrative · Left ventricular systolic function is hyperdynamic (EF > 70).  · Left ventricular wall thickness is consistent with moderate-to-severe   concentric hypertrophy.  · Left ventricular diastolic dysfunction (grade II) consistent with   pseudonormalization.  · There is calcification of the aortic valve.  · There is a solitary cyst/mass noted in the liver. Consider further   imaging/clinical correlation required        Assessment/Plan      Active Hospital Problems    Diagnosis  POA   • Acute hypoxemic respiratory failure (CMS/HCC) [J96.01]  Yes   • Pleural effusion, right [J90]  Yes   • CAD (coronary artery disease) [I25.10]  Yes   • CKD (chronic kidney disease) stage 3, GFR 30-59 ml/min (CMS/HCC) [N18.3]  Yes   • Lung mass [R91.8]  Yes   • Hypertension [I10]  Yes   • Hyperlipidemia [E78.5]  Yes   • Gastroesophageal reflux disease without esophagitis [K21.9]  Yes   • Diabetes mellitus (CMS/HCC) [E11.9]  Yes   • Benign prostatic hyperplasia without lower urinary tract symptoms [N40.0]  Yes      Resolved Hospital Problems   No resolved problems to display.     74-year-old admitted with acute on chronic hypoxemic respiratory failure and right pleural effusion    · S/P thoracentesis 12/13/2019 2500 mL  · To have CT on Monday to determine bronchoscopy versus CT-guided biopsy  · Continue diuretics monitoring renal function and volume status  · May  require oxygen on discharge  · Diuretics, monitor renal fx and volume status. ECHO today  · Outpatient sleep apnea testing per pulmonology  · Thrombocytopenia: Continue to monitor  · Diabetes mellitus type 2: A1c 6.  Probably could loosen control given age  · +SCDs  Discussed with patient and nursing staff.    Luis Felipe Sharma MD  12/14/19  11:49 AM

## 2019-12-14 NOTE — PROGRESS NOTES
"                                              LOS: 2 days   Patient Care Team:  Ricardo Anderson MD as PCP - General (Family Medicine)    Chief Complaint:  F/up pleural effusion, masslike consolidation and medical problems listed below    Subjective   Interval History  I reviewed the admission note, progress notes, PMH, PSH, Family hx, social history, imagings and prior records on this admission, summarized the finding in my note and formulated a transition of care plan.     Reported wet cough but no sputum production.  He has no shortness of breath at rest and he is on room air.  However, he has difficulty breathing on mild activities such as when walking to the bathroom.    REVIEW OF SYSTEMS:   CARDIOVASCULAR: No chest pain, chest pressure or chest discomfort. No palpitations or edema.   Constitutional: No fever or chills  GASTROINTESTINAL: No anorexia, nausea, vomiting or diarrhea. No abdominal pain or blood.   HEMATOLOGIC: No bleeding or bruising.     Ventilator/Non-Invasive Ventilation Settings (From admission, onward)    None                Physical Exam:     Vital Signs  Temp:  [98.2 °F (36.8 °C)-98.5 °F (36.9 °C)] 98.5 °F (36.9 °C)  Heart Rate:  [56-74] 74  Resp:  [16-20] 18  BP: (120-169)/(55-84) 139/71    Intake/Output Summary (Last 24 hours) at 12/14/2019 1748  Last data filed at 12/14/2019 0915  Gross per 24 hour   Intake 120 ml   Output 450 ml   Net -330 ml     Flowsheet Rows      First Filed Value   Admission Height  185.4 cm (73\") Documented at 12/12/2019 1104   Admission Weight  (!) 145 kg (319 lb) Documented at 12/12/2019 1104          General Appearance:    Alert, cooperative, in no acute distress   ENMT:  Mallampati score 3, moist mucous membrane   Eyes: Pupils equals and reactive to light. EOMI   Neck:   Large. Trachea midline. No thyromegaly.   Lungs:    Decreased air entry in the right lower lobe.  No wheezing.  Nonlabored breathing at rest    Heart:    Regular rhythm and normal rate, normal " S1 and S2, no            murmur   Skin:    No abnormalities observed   Abdomen:     Obese. Soft. No tenderness. No HSM.   Neuro:   Conscious, alert, oriented x3. Strength 5/5 in upper and lower ext   Extremities:   Moves all extremities well, + edema, no cyanosis, no             Redness          Results Review:        Results from last 7 days   Lab Units 12/14/19  0536 12/13/19  0604 12/12/19  1109   SODIUM mmol/L 139 140 141  136   POTASSIUM mmol/L 3.8 3.9 4.3  4.1   CHLORIDE mmol/L 98 100 98  98   CO2 mmol/L 31.7* 30.7* 28.3  28.1   BUN mg/dL 20 16 20  20   CREATININE mg/dL 1.29* 1.25 1.43*  1.37*   GLUCOSE mg/dL 143* 116* 134*  133*   CALCIUM mg/dL 8.5* 8.9 9.0  9.4     Results from last 7 days   Lab Units 12/12/19  1109   TROPONIN T ng/mL <0.010     Results from last 7 days   Lab Units 12/13/19  0604 12/12/19  1109 12/11/19  1353   WBC 10*3/mm3 4.68 5.46 6.86   HEMOGLOBIN g/dL 12.4* 13.1 13.5   HEMATOCRIT % 38.7 42.1 42.0   PLATELETS 10*3/mm3 107* 141 145     Results from last 7 days   Lab Units 12/13/19  0604 12/12/19  1109   INR  1.21* 1.22*   APTT seconds 31.6  --      Results from last 7 days   Lab Units 12/12/19  1109   PROBNP pg/mL 394.2       I reviewed the patient's new clinical results.  I personally viewed and interpreted the patient's CXR        Medication Review:     amLODIPine 10 mg Oral Daily   aspirin 81 mg Oral Daily   citalopram 20 mg Oral Daily   docusate sodium 100 mg Oral BID   furosemide 40 mg Oral Daily   insulin glargine 15 Units Subcutaneous Nightly   insulin lispro 0-14 Units Subcutaneous 4x Daily With Meals & Nightly   ipratropium-albuterol 3 mL Nebulization 4x Daily - RT   losartan 50 mg Oral Q24H   metoprolol tartrate 25 mg Oral BID   pantoprazole 40 mg Oral QAM   pravastatin 40 mg Oral Nightly   sodium chloride 10 mL Intravenous Q12H   tamsulosin 0.4 mg Oral Daily         hold 1 each       Diagnostic imaging:  I personally and independently reviewed the following  images:  Loculated right pleural effusion.  Underlying pulmonary opacity, mass cannot be ruled out      Assessment     1. Large loculated right pleural effusion, s/p right thoracentesis 12/13/19 --> 2500 ml, exudative, cytology pending  2. Right lower lobe mass like consolidation  3. COPD without obvious exacerbation  4. Acute versus chronic hypoxia  5. Chronic leg edema  6. Ex-smoker  7. Elevated bicarbonate    Relevant Medical Diagnoses  · CAD with history of bypass  · Suspected obstructive sleep apnea  · Diabetes mellitus  · CKD    All problems new to me    Plan   · Awaiting cytology of the pleural fluid should be back on Monday or Tuesday.  · CT chest without contrast on Monday and if there is persistent right lower lobe masslike consolidation then we consider bronchoscopy versus CT-guided biopsy, assuming that the cytology of the pleural fluid is not informative.  · Start incentive spirometry.  He could simply have atelectasis.  · Check blood gas to evaluate for hypercapnia.  · Continue diuresis.  · PRN Tamiko Rutledge MD  12/14/19  5:48 PM        This note was dictated utilizing Dragon dictation

## 2019-12-15 NOTE — PLAN OF CARE
Problem: Patient Care Overview  Goal: Plan of Care Review  Flowsheets (Taken 12/15/2019 5179)  Plan of Care Reviewed With: patient; daughter  Note:   PATIENT WILL NEED PT TO HELP WITH HIS ENDURANCE AS HIS TOLERANCE FOR ACTIVITY IS LOWER THAN HIS BASELINE.  PT WILL FOCUS ON STRENGTHENING AND FUNCTIONAL ACTIVITIES IN ORDER TO IMPROVE HIS FUNCTIONAL MOBILITY.

## 2019-12-15 NOTE — PLAN OF CARE
Problem: Patient Care Overview  Goal: Plan of Care Review  Outcome: Ongoing (interventions implemented as appropriate)  Flowsheets (Taken 12/15/2019 8696)  Progress: improving  Plan of Care Reviewed With: patient     Patient remains on 1 L NC., SOA with exertion tachypnic as well. Results for cytology pending. Once received MD to decide if CTA vs Bronch needed. Hopeful for results Monday. Up in chair for a bit, worked with PT. No c/o pain. VSS

## 2019-12-15 NOTE — PROGRESS NOTES
"                                              LOS: 3 days   Patient Care Team:  Ricardo Anderson MD as PCP - General (Family Medicine)    Chief Complaint:  F/up pleural effusion, masslike consolidation and medical problems listed below    Subjective   Interval History  Had significant shortness of breath when he ambulated with PT. he appeared to be in distress when he returned back to bed.  He denies cough.    REVIEW OF SYSTEMS:   CARDIOVASCULAR: No chest pain, chest pressure or chest discomfort. No palpitations but edema.   Constitutional: No fever or chills  GASTROINTESTINAL: No anorexia, nausea, vomiting or diarrhea. No abdominal pain or blood.       Ventilator/Non-Invasive Ventilation Settings (From admission, onward)    None                Physical Exam:     Vital Signs  Temp:  [97.8 °F (36.6 °C)-99.4 °F (37.4 °C)] 99.4 °F (37.4 °C)  Heart Rate:  [57-75] 63  Resp:  [16-20] 20  BP: (119-159)/(69-80) 156/78    Intake/Output Summary (Last 24 hours) at 12/15/2019 1454  Last data filed at 12/15/2019 1345  Gross per 24 hour   Intake --   Output 1550 ml   Net -1550 ml     Flowsheet Rows      First Filed Value   Admission Height  185.4 cm (73\") Documented at 12/12/2019 1104   Admission Weight  (!) 145 kg (319 lb) Documented at 12/12/2019 1104          General Appearance:    Alert, cooperative, in no acute distress   ENMT:  Mallampati score 3, moist mucous membrane   Eyes: Pupils equals and reactive to light. EOMI   Neck:   Large. Trachea midline. No thyromegaly.   Lungs:    Decreased air entry in the right lower lobe.  No wheezing.  Nonlabored breathing at rest    Heart:    Regular rhythm and normal rate, normal S1 and S2, no            murmur   Skin:    No abnormalities observed   Abdomen:     Obese. Soft. No tenderness. No HSM.   Neuro:   Conscious, alert, oriented x3. Strength 5/5 in upper and lower ext   Extremities:   Moves all extremities well, mild edema, no cyanosis, no             Redness          Results " Review:        Results from last 7 days   Lab Units 12/15/19  0603 12/14/19  0536 12/13/19  0604   SODIUM mmol/L 138 139 140   POTASSIUM mmol/L 3.8 3.8 3.9   CHLORIDE mmol/L 98 98 100   CO2 mmol/L 29.3* 31.7* 30.7*   BUN mg/dL 23 20 16   CREATININE mg/dL 1.39* 1.29* 1.25   GLUCOSE mg/dL 167* 143* 116*   CALCIUM mg/dL 8.3* 8.5* 8.9     Results from last 7 days   Lab Units 12/12/19  1109   TROPONIN T ng/mL <0.010     Results from last 7 days   Lab Units 12/15/19  0603 12/13/19  0604 12/12/19  1109   WBC 10*3/mm3 6.71 4.68 5.46   HEMOGLOBIN g/dL 12.2* 12.4* 13.1   HEMATOCRIT % 36.7* 38.7 42.1   PLATELETS 10*3/mm3 102* 107* 141     Results from last 7 days   Lab Units 12/13/19  0604 12/12/19  1109   INR  1.21* 1.22*   APTT seconds 31.6  --      Results from last 7 days   Lab Units 12/12/19  1109   PROBNP pg/mL 394.2       I reviewed the patient's new clinical results.  I personally viewed and interpreted the patient's CXR        Medication Review:     amLODIPine 10 mg Oral Daily   aspirin 81 mg Oral Daily   citalopram 20 mg Oral Daily   docusate sodium 100 mg Oral BID   furosemide 40 mg Oral Daily   insulin glargine 15 Units Subcutaneous Nightly   insulin lispro 0-14 Units Subcutaneous 4x Daily With Meals & Nightly   ipratropium-albuterol 3 mL Nebulization 4x Daily - RT   losartan 50 mg Oral Q24H   metoprolol tartrate 25 mg Oral BID   pantoprazole 40 mg Oral QAM   pravastatin 40 mg Oral Nightly   sodium chloride 10 mL Intravenous Q12H   tamsulosin 0.4 mg Oral Daily         hold 1 each       Diagnostic imaging:  I personally and independently reviewed the following images:  Loculated right pleural effusion.  Underlying pulmonary opacity, mass cannot be ruled out      Assessment     1. Large loculated right pleural effusion, s/p right thoracentesis 12/13/19 --> 2500 ml, exudative, cytology pending  2. Right lower lobe mass like consolidation  3. COPD without obvious exacerbation  4. Acute versus chronic  hypoxia  5. Chronic compensated hypercapnia, NEW  6. MARIELLE  7. Chronic leg edema  8. Ex-smoker    Relevant Medical Diagnoses  · CAD with history of bypass  · Suspected obstructive sleep apnea  · Diabetes mellitus  · CKD    All problems new to me    Plan   · Awaiting cytology of the pleural fluid should be back on Monday or Tuesday.  · CT chest without contrast on Monday and if there is persistent right lower lobe masslike consolidation then we consider bronchoscopy versus CT-guided biopsy, assuming that the cytology of the pleural fluid is not informative.  · Start incentive spirometry.  He could simply have atelectasis.  · Decrease Lasix to 20 mg daily due to worsening creatinine  · Start incentive spirometry  · PRN DuoNeb  · Would benefit from outpatient evaluation for sleep apnea and likely BiPAP therapy for sleep apnea and chronic hypercapnia            Tabitha Rutledge MD  12/15/19  2:54 PM        This note was dictated utilizing Dragon dictation

## 2019-12-15 NOTE — PROGRESS NOTES
"    Name: Pablo Collier ADMIT: 2019   : 1945  PCP: Ricardo Anderson MD    MRN: [id number] LOS: 3 days   AGE/SEX: 74 y.o. male  ROOM: [facility]   Subjective   Chief Complaint   Patient presents with   • Shortness of Breath     Pt had xray done at PCP's office yesterday and was called today and told it was abnormal. Complains of SOA.     \"Not good\" but he does not know exactly why.  He denies any chest pain or shortness of breath at the moment.  He thought he was going to get the CT scan today.    Review of Systems   Constitutional: Negative for chills and fever.   Respiratory: Negative for shortness of breath.    Cardiovascular: Negative for chest pain.   Gastrointestinal: Negative for diarrhea, nausea and vomiting.   Genitourinary: Negative for dysuria.     Objective   Vital Signs  Temp:  [97.8 °F (36.6 °C)-99.4 °F (37.4 °C)] 97.8 °F (36.6 °C)  Heart Rate:  [57-75] 62  Resp:  [16-20] 16  BP: (119-159)/(69-80) 159/80  SpO2:  [89 %-97 %] 97 %  on  Flow (L/min):  [1] 1;   Device (Oxygen Therapy): nasal cannula  Body mass index is 37.34 kg/m².    Physical Exam   Constitutional: He is oriented to person, place, and time. He appears well-developed and well-nourished. No distress.   HENT:   Head: Normocephalic and atraumatic.   Eyes: Conjunctivae are normal. No scleral icterus.   Cardiovascular: Normal rate, regular rhythm and normal heart sounds.   No murmur heard.  Pulmonary/Chest: Effort normal. No respiratory distress. He has decreased breath sounds.   Abdominal: Soft. Bowel sounds are normal. There is no tenderness.   Musculoskeletal: He exhibits no edema or deformity.   Neurological: He is alert and oriented to person, place, and time.   Skin: Skin is warm and dry. He is not diaphoretic.   Nursing note and vitals reviewed.    Results Review:       I reviewed the patient's new clinical results.  Results from last 7 days   Lab Units 12/15/19  0603 19  0604 19  1109 19  1353   WBC 10*3/mm3 " 6.71 4.68 5.46 6.86   HEMOGLOBIN g/dL 12.2* 12.4* 13.1 13.5   PLATELETS 10*3/mm3 102* 107* 141 145     Results from last 7 days   Lab Units 12/15/19  0603 12/14/19  0536 12/13/19  0604 12/12/19  1109   SODIUM mmol/L 138 139 140 141  136   POTASSIUM mmol/L 3.8 3.8 3.9 4.3  4.1   CHLORIDE mmol/L 98 98 100 98  98   CO2 mmol/L 29.3* 31.7* 30.7* 28.3  28.1   BUN mg/dL 23 20 16 20  20   CREATININE mg/dL 1.39* 1.29* 1.25 1.43*  1.37*   GLUCOSE mg/dL 167* 143* 116* 134*  133*   Estimated Creatinine Clearance: 66.3 mL/min (A) (by C-G formula based on SCr of 1.39 mg/dL (H)).  Results from last 7 days   Lab Units 12/13/19  0604 12/12/19  1109 12/11/19  1353   ALBUMIN g/dL 3.60 4.10 4.20   BILIRUBIN mg/dL 0.6 0.7 0.8   ALK PHOS U/L 60 67 70   AST (SGOT) U/L 20 22 19   ALT (SGPT) U/L 18 17 19     Results from last 7 days   Lab Units 12/15/19  0603 12/14/19  0536 12/13/19  0604 12/12/19  1109 12/11/19  1353   CALCIUM mg/dL 8.3* 8.5* 8.9 9.0  9.4 9.4   ALBUMIN g/dL  --   --  3.60 4.10 4.20     Results from last 7 days   Lab Units 12/12/19  1141 12/12/19  1109   PROCALCITONIN ng/mL  --  0.14   LACTATE mmol/L 1.8  --        Coag   Results from last 7 days   Lab Units 12/13/19  0604 12/12/19  1109   INR  1.21* 1.22*   APTT seconds 31.6  --      Lab Results   Component Value Date    HGBA1C 6.00 (H) 12/11/2019    HGBA1C 6.39 (H) 02/26/2019     Glucose   Date/Time Value Ref Range Status   12/15/2019 0617 143 (H) 70 - 130 mg/dL Final   12/14/2019 2102 188 (H) 70 - 130 mg/dL Final   12/14/2019 1617 132 (H) 70 - 130 mg/dL Final   12/14/2019 1109 153 (H) 70 - 130 mg/dL Final   12/14/2019 0623 135 (H) 70 - 130 mg/dL Final   12/13/2019 2103 124 70 - 130 mg/dL Final   12/13/2019 1621 195 (H) 70 - 130 mg/dL Final   12/13/2019 1123 131 (H) 70 - 130 mg/dL Final      amLODIPine 10 mg Oral Daily   aspirin 81 mg Oral Daily   citalopram 20 mg Oral Daily   docusate sodium 100 mg Oral BID   furosemide 40 mg Oral Daily   insulin glargine 15  Units Subcutaneous Nightly   insulin lispro 0-14 Units Subcutaneous 4x Daily With Meals & Nightly   ipratropium-albuterol 3 mL Nebulization 4x Daily - RT   losartan 50 mg Oral Q24H   metoprolol tartrate 25 mg Oral BID   pantoprazole 40 mg Oral QAM   pravastatin 40 mg Oral Nightly   sodium chloride 10 mL Intravenous Q12H   tamsulosin 0.4 mg Oral Daily       hold 1 each   Diet Regular; Cardiac, Consistent Carbohydrate, Renal    Results for orders placed during the hospital encounter of 12/12/19   Adult Transthoracic Echo Complete W/ Cont if Necessary Per Protocol    Narrative · Left ventricular systolic function is hyperdynamic (EF > 70).  · Left ventricular wall thickness is consistent with moderate-to-severe   concentric hypertrophy.  · Left ventricular diastolic dysfunction (grade II) consistent with   pseudonormalization.  · There is calcification of the aortic valve.  · There is a solitary cyst/mass noted in the liver. Consider further   imaging/clinical correlation required        Assessment/Plan      Active Hospital Problems    Diagnosis  POA   • Acute hypoxemic respiratory failure (CMS/HCC) [J96.01]  Yes   • Pleural effusion, right [J90]  Yes   • CAD (coronary artery disease) [I25.10]  Yes   • CKD (chronic kidney disease) stage 3, GFR 30-59 ml/min (CMS/HCC) [N18.3]  Yes   • Lung mass [R91.8]  Yes   • Hypertension [I10]  Yes   • Hyperlipidemia [E78.5]  Yes   • Gastroesophageal reflux disease without esophagitis [K21.9]  Yes   • Diabetes mellitus (CMS/HCC) [E11.9]  Yes   • Benign prostatic hyperplasia without lower urinary tract symptoms [N40.0]  Yes      Resolved Hospital Problems   No resolved problems to display.     74-year-old admitted with acute on chronic hypoxemic respiratory failure and right pleural effusion    · S/P thoracentesis 12/13/2019 2500 mL.  Awaiting cytology  · Continue diuretics.  Creatinine trending up the last couple of days  · CT tomorrow to determine bronchoscopy versus CT-guided  biopsy  · May require oxygen on discharge  · Outpatient sleep apnea testing per pulmonology  · Thrombocytopenia: Continue to monitor  · Diabetes mellitus type 2: A1c 6.  Probably could loosen control given age  · +SCDs  · Disposition: To be determined  Discussed with patient and nursing staff.    Luis Felipe Sharma MD  12/15/19  11:07 AM

## 2019-12-15 NOTE — PLAN OF CARE
Problem: Patient Care Overview  Goal: Plan of Care Review  Outcome: Ongoing (interventions implemented as appropriate)  Flowsheets (Taken 12/14/2019 0915 by Soraida Bliss, RN)  Plan of Care Reviewed With: patient  Note:   Pt not sleeping well this night. Tolerating 1LPM of O2 via NC with sat greaer than 92%. He was asking about plan of care and is hesitant to take medicines that he does not think he knows. There is some confusion with brand vs generic names. Pt pleasant, needs more encouragement to participate in plan of care including using the IS.

## 2019-12-15 NOTE — THERAPY EVALUATION
Acute Care - Physical Therapy Initial Evaluation  [facility]     Patient Name: Pablo Collier  : 1945  MRN: [id number]  Today's Date: 12/15/2019   Onset of Illness/Injury or Date of Surgery: 19  Date of Referral to PT: 19  Referring Physician: MEGHA      Admit Date: 2019    Visit Dx:     ICD-10-CM ICD-9-CM   1. Acute hypoxemic respiratory failure (CMS/HCC) J96.01 518.81   2. Pleural effusion on right J90 511.9   3. Right lower lobe lung mass R91.8 786.6   4. Left adrenal mass (CMS/HCC) E27.8 255.8   5. Decreased mobility and endurance Z74.09 780.99     Patient Active Problem List   Diagnosis   • Diabetes mellitus (CMS/HCC)   • Hypertension   • Hyperlipidemia   • Depression   • Benign prostatic hyperplasia without lower urinary tract symptoms   • Gastroesophageal reflux disease without esophagitis   • Atypical chest pain   • Shortness of breath on exertion   • Acute hypoxemic respiratory failure (CMS/HCC)   • Pleural effusion, right   • CAD (coronary artery disease)   • CKD (chronic kidney disease) stage 3, GFR 30-59 ml/min (CMS/HCC)   • Lung mass     Past Medical History:   Diagnosis Date   • Diabetes mellitus (CMS/HCC)    • Hyperlipidemia    • Hypertension    • Myocardial infarction (CMS/HCC)      Past Surgical History:   Procedure Laterality Date   • COLONOSCOPY  10/30/2019    DR. MARQUISE DE DIOS [place]   • CORONARY ARTERY BYPASS GRAFT      DONE IN [place]   • REPLACEMENT TOTAL KNEE Bilateral     DONE IN [place]        PT ASSESSMENT (last 12 hours)      Physical Therapy Evaluation     Row Name 12/15/19 1441          PT Evaluation Time/Intention    Subjective Information  complains of;weakness;fatigue  -JR     Document Type  evaluation  -JR     Mode of Treatment  physical therapy  -JR     Total Evaluation Minutes, Physical Therapy  30  -JR     Patient Effort  adequate  -JR     Symptoms Noted During/After Treatment  fatigue;increased pain  -JR      Row Name 12/15/19 1441          General Information    Patient Profile Reviewed?  yes  -JR     Onset of Illness/Injury or Date of Surgery  12/12/19  -JR     Referring Physician  MEGHA  -JR     Patient Observations  alert;cooperative;agree to therapy  -JR     Patient/Family Observations  DAUGHTER PRESENT  -JR     General Observations of Patient  SUPINE IN BED ON 2 L OF O2, RESTING.   -JR     Prior Level of Function  independent:;all household mobility;community mobility;gait;ADL's USES ROLLATOR AND AMBULATES SHORT DISTANCES.   -JR     Equipment Currently Used at Home  walker, rolling  -JR     Pertinent History of Current Functional Problem  HYPOXIA, CKD, LUNG MASS.    -JR     Existing Precautions/Restrictions  fall;oxygen therapy device and L/min  -JR     Risks Reviewed  patient and family:;LOB;nausea/vomiting;dizziness;increased discomfort;change in vital signs;increased drainage;lines disloged  -JR     Benefits Reviewed  patient and family:;improve function;increase independence;increase strength;increase balance;decrease pain;decrease risk of DVT;improve skin integrity;increase knowledge  -JR     Barriers to Rehab  medically complex  -JR     Row Name 12/15/19 1441          Relationship/Environment    Primary Source of Support/Comfort  child(og)  -JR     Lives With  child(og), adult  -JR     Row Name 12/15/19 1441          Resource/Environmental Concerns    Current Living Arrangements  home/apartment/condo  -JR     Row Name 12/15/19 1441          Cognitive Assessment/Intervention- PT/OT    Orientation Status (Cognition)  oriented x 4  -JR     Follows Commands (Cognition)  WFL  -JR     Row Name 12/15/19 1441          Bed Mobility Assessment/Treatment    Bed Mobility Assessment/Treatment  bed mobility (all) activities;supine-sit;sit-supine  -JR     Supine-Sit [place] (Bed Mobility)  minimum assist (75% patient effort)  -JR     Sit-Supine [place] (Bed Mobility)  minimum assist (75% patient effort)  -JR      Comment (Bed Mobility)  FATIGUES QUICKLY.    -JR     Row Name 12/15/19 1441          Transfer Assessment/Treatment    Transfer Assessment/Treatment  sit-stand transfer;stand-sit transfer  -     Sit-Stand [place] (Transfers)  verbal cues;contact guard;1 person assist  -     Stand-Sit [place] (Transfers)  verbal cues;contact guard  -JR     Row Name 12/15/19 1441          Sit-Stand Transfer    Assistive Device (Sit-Stand Transfers)  walker, front-wheeled  -JR     Row Name 12/15/19 1441          Stand-Sit Transfer    Assistive Device (Stand-Sit Transfers)  walker, front-wheeled  -JR     Row Name 12/15/19 1441          Gait/Stairs Assessment/Training    Gait/Stairs Assessment/Training  gait/ambulation independence;gait/ambulation assistive device;distance ambulated;gait deviations  -     [place] Level (Gait)  contact guard;1 person assist  -     Assistive Device (Gait)  walker, front-wheeled  -     Distance in Feet (Gait)  -- 150  -     Deviations/Abnormal Patterns (Gait)  gait speed decreased;stride length decreased  -     Bilateral Gait Deviations  forward flexed posture TENDS TO KEEP WALKER PLACED TOO FAR FORWARD.    -JR     Row Name 12/15/19 1441          General ROM    GENERAL ROM COMMENTS  WFLs  -JR     Row Name 12/15/19 1441          MMT (Manual Muscle Testing)    General MMT Comments  4/5 THROUGHOUT ALL EXTREMITIES.   -JR     Row Name 12/15/19 1441          Static Standing Balance    Level of [place] (Supported Standing, Static Balance)  contact guard assist  -     Time Able to Maintain Position (Supported Standing, Static Balance)  1 to 2 minutes  -     Comment (Supported Standing, Static Balance)  Pt STOOD IN ORDER TO HAVE NEW GOWN DONNED.    -JR     Row Name 12/15/19 1441          Pain Assessment    Additional Documentation  Pain Scale: Numbers Pre/Post-Treatment (Group)  -JR     Row Name 12/15/19 1441          Pain Scale: Numbers Pre/Post-Treatment    Pain Scale:  Numbers, Pretreatment  0/10 - no pain  -JR     Row Name             Wound 12/13/19 1542 Right posterior;medial back Puncture    Wound - Properties Group Date first assessed: 12/13/19  -DW Time first assessed: 1542  -DW Side: Right  -DW Orientation: posterior;medial  -DW Location: back  -DW Primary Wound Type: Puncture  -DW    Row Name 12/15/19 1441          Plan of Care Review    Plan of Care Reviewed With  patient;pio GARZA     Row Name 12/15/19 1441          Physical Therapy Clinical Impression    Date of Referral to PT  12/14/19  -JR     PT Diagnosis (PT Clinical Impression)  -- DECREASED MOBILITY  -JR     Prognosis (PT Clinical Impression)  GOOD  -JR     Functional Level at Time of Evaluation (PT Clinical Impression)  MIN-CGA.   -JR     Patient/Family Goals Statement (PT Clinical Impression)  GO HOME  -JR     Criteria for Skilled Interventions Met (PT Clinical Impression)  yes  -JR     Pathology/Pathophysiology Noted (Describe Specifically for Each System)  musculoskeletal;cardiovascular;pulmonary  -JR     Impairments Found (describe specific impairments)  aerobic capacity/endurance;gait, locomotion, and balance  -JR     Functional Limitations in Following Categories (Describe Specific Limitations)  self-care;home management;community/leisure  -JR     Rehab Potential (PT Clinical Summary)  good, to achieve stated therapy goals  -JR     Predicted Duration of Therapy (PT)  1-2 MONTHS  -JR     Care Plan Review (PT)  evaluation/treatment results reviewed;care plan/treatment goals reviewed;risks/benefits reviewed;current/potential barriers reviewed;patient/other agree to care plan  -JR     Care Plan Review, Other Participant (PT Clinical Impression)  pio GARZA     Row Name 12/15/19 1441          Vital Signs    Pre SpO2 (%)  95  -JR     O2 Delivery Pre Treatment  supplemental O2  -JR     Intra SpO2 (%)  -- 93  -JR     O2 Delivery Intra Treatment  supplemental O2  -JR     Post SpO2 (%)  -- 96  -JR     O2  Delivery Post Treatment  supplemental O2  -JR     Row Name 12/15/19 1441          Positioning and Restraints    Pre-Treatment Position  in bed  -JR     Post Treatment Position  bed  -JR     In Bed  notified nsg;sitting EOB;call light within reach;patient within staff view;with family/caregiver  -JR       User Key  (r) = Recorded By, (t) = Taken By, (c) = Cosigned By    Initials Name Provider Type    Kojo Tucker, PT Physical Therapist    Malissa Ruiz RN Registered Nurse          PT Recommendation and Plan  Anticipated Discharge Disposition (PT): home with home health  Planned Therapy Interventions (PT Eval): balance training, bed mobility training, gait training, home exercise program, ROM (range of motion), transfer training  Therapy Frequency (PT Clinical Impression): daily  Outcome Summary/Treatment Plan (PT)  Anticipated Discharge Disposition (PT): home with home health  Plan of Care Reviewed With: patient, daughter  Outcome Measures     Row Name 12/15/19 1400             How much help from another person do you currently need...    Turning from your back to your side while in flat bed without using bedrails?  4  -JR      Moving from lying on back to sitting on the side of a flat bed without bedrails?  3  -JR      Moving to and from a bed to a chair (including a wheelchair)?  3  -JR      Standing up from a chair using your arms (e.g., wheelchair, bedside chair)?  3  -JR      Climbing 3-5 steps with a railing?  2  -JR      To walk in hospital room?  3  -JR      AM-PAC 6 Clicks Score (PT)  18  -JR        User Key  (r) = Recorded By, (t) = Taken By, (c) = Cosigned By    Initials Name Provider Type    Kojo Tucker, PT Physical Therapist         Time Calculation:   PT Charges     Row Name 12/15/19 3728             Time Calculation    Start Time  1422  -JR      Stop Time  1453  -JR      Time Calculation (min)  31 min  -      PT Received On  12/15/19  -      PT - Next Appointment  12/16/19   -      PT Goal Re-Cert Due Date  12/29/19  -        User Key  (r) = Recorded By, (t) = Taken By, (c) = Cosigned By    Initials Name Provider Type    Kojo Tucker, PT Physical Therapist        Therapy Charges for Today     Code Description Service Date Service Provider Modifiers Qty    37517771167 HC PT EVAL MOD COMPLEXITY 1 12/15/2019 Kojo Burgess, PT GP 1    38689021407 HC GAIT TRAINING EA 15 MIN 12/15/2019 Kojo Burgess, PT GP 1    60420509622 HC PT THERAPEUTIC ACT EA 15 MIN 12/15/2019 Kojo Burgess, PT GP 1          PT G-Codes  AM-PAC 6 Clicks Score (PT): 18      Kojo Burgess, PT  12/15/2019

## 2019-12-16 NOTE — PLAN OF CARE
Problem: Patient Care Overview  Goal: Plan of Care Review  Outcome: Ongoing (interventions implemented as appropriate)  Flowsheets (Taken 12/16/2019 0425)  Progress: no change  Plan of Care Reviewed With: patient  Outcome Summary: VSS. Patient remains on 2L oxygen. Patient SOA with minimal exertion.   Problem: Diabetes, Type 2 (Adult)  Goal: Signs and Symptoms of Listed Potential Problems Will be Absent, Minimized or Managed (Diabetes, Type 2)  Outcome: Ongoing (interventions implemented as appropriate)     Problem: Kidney Disease, Chronic/End Stage Renal Disease (Adult)  Goal: Signs and Symptoms of Listed Potential Problems Will be Absent, Minimized or Managed (Kidney Disease, Chronic/End Stage Renal Disease)  Outcome: Ongoing (interventions implemented as appropriate)     Problem: Breathing Pattern Ineffective (Adult)  Goal: Effective Oxygenation/Ventilation  Outcome: Ongoing (interventions implemented as appropriate)     Problem: Skin Injury Risk (Adult)  Goal: Skin Health and Integrity  Outcome: Ongoing (interventions implemented as appropriate)   Plan is for repeat CT today to determine if patient may need a bronch or ct guided biopsy of possible mass. Will continue to monitor.

## 2019-12-16 NOTE — PLAN OF CARE
Problem: Patient Care Overview  Goal: Plan of Care Review  Outcome: Ongoing (interventions implemented as appropriate)  Flowsheets (Taken 12/16/2019 1703)  Progress: no change  Plan of Care Reviewed With: patient  Outcome Summary: Pt half out of bed upon arrival of PT today. Pt with c/o fatigue. Pt amb from bed to bathroom and then amb in hallway 70-80' with rollator that is too short and facilitates poor posture. Pt became fatigued and requested to return to bed. Pt beacme very impulsive when he saw his bed and threw himself into bed very unsafe. Pt woeuld benefit from cont'd PT for endurance, gt/transfer training, HEP and activity tolerance

## 2019-12-16 NOTE — PROGRESS NOTES
Name: Pablo Collier ADMIT: 2019   : 1945  PCP: Ricardo Anderson MD    MRN: [id number] LOS: 4 days   AGE/SEX: 74 y.o. male  ROOM: [facility]/   Subjective   Chief Complaint   Patient presents with   • Shortness of Breath     Pt had xray done at PCP's office yesterday and was called today and told it was abnormal. Complains of SOA.     No new complaints. Denies chest pain. Asking when CT is.    Review of Systems   Constitutional: Negative for chills and fever.   Respiratory: Negative for shortness of breath.    Cardiovascular: Negative for chest pain.   Gastrointestinal: Negative for diarrhea, nausea and vomiting.   Genitourinary: Negative for dysuria.     Objective   Vital Signs  Temp:  [98.7 °F (37.1 °C)-99.4 °F (37.4 °C)] 99.2 °F (37.3 °C)  Heart Rate:  [58-65] 65  Resp:  [16-20] 20  BP: (121-156)/(68-78) 129/78  SpO2:  [90 %-97 %] 97 %  on  Flow (L/min):  [1-2] 2;   Device (Oxygen Therapy): nasal cannula  Body mass index is 38.45 kg/m².    Physical Exam   Constitutional: He is oriented to person, place, and time. He appears well-developed and well-nourished. No distress.   HENT:   Head: Normocephalic and atraumatic.   Eyes: Conjunctivae are normal. No scleral icterus.   Cardiovascular: Normal rate, regular rhythm and normal heart sounds.   No murmur heard.  Pulmonary/Chest: Effort normal. No respiratory distress. He has decreased breath sounds.   Abdominal: Soft. Bowel sounds are normal. There is no tenderness.   Musculoskeletal: He exhibits no edema or deformity.   Neurological: He is alert and oriented to person, place, and time.   Skin: Skin is warm and dry. He is not diaphoretic.   Nursing note and vitals reviewed.    Results Review:       I reviewed the patient's new clinical results.  Results from last 7 days   Lab Units 12/15/19  0603 19  0604 19  1109 19  1353   WBC 10*3/mm3 6.71 4.68 5.46 6.86   HEMOGLOBIN g/dL 12.2* 12.4* 13.1 13.5   PLATELETS 10*3/mm3 102* 107* 141 145      Results from last 7 days   Lab Units 12/15/19  0603 12/14/19  0536 12/13/19  0604 12/12/19  1109   SODIUM mmol/L 138 139 140 141  136   POTASSIUM mmol/L 3.8 3.8 3.9 4.3  4.1   CHLORIDE mmol/L 98 98 100 98  98   CO2 mmol/L 29.3* 31.7* 30.7* 28.3  28.1   BUN mg/dL 23 20 16 20  20   CREATININE mg/dL 1.39* 1.29* 1.25 1.43*  1.37*   GLUCOSE mg/dL 167* 143* 116* 134*  133*   Estimated Creatinine Clearance: 67.6 mL/min (A) (by C-G formula based on SCr of 1.39 mg/dL (H)).  Results from last 7 days   Lab Units 12/13/19  0604 12/12/19  1109 12/11/19  1353   ALBUMIN g/dL 3.60 4.10 4.20   BILIRUBIN mg/dL 0.6 0.7 0.8   ALK PHOS U/L 60 67 70   AST (SGOT) U/L 20 22 19   ALT (SGPT) U/L 18 17 19     Results from last 7 days   Lab Units 12/15/19  0603 12/14/19  0536 12/13/19  0604 12/12/19  1109 12/11/19  1353   CALCIUM mg/dL 8.3* 8.5* 8.9 9.0  9.4 9.4   ALBUMIN g/dL  --   --  3.60 4.10 4.20     Results from last 7 days   Lab Units 12/12/19  1141 12/12/19  1109   PROCALCITONIN ng/mL  --  0.14   LACTATE mmol/L 1.8  --      Lab Results   Component Value Date    HGBA1C 6.00 (H) 12/11/2019    HGBA1C 6.39 (H) 02/26/2019     Glucose   Date/Time Value Ref Range Status   12/16/2019 0625 191 (H) 70 - 130 mg/dL Final   12/15/2019 2048 137 (H) 70 - 130 mg/dL Final   12/15/2019 1608 150 (H) 70 - 130 mg/dL Final   12/15/2019 1104 154 (H) 70 - 130 mg/dL Final   12/15/2019 0617 143 (H) 70 - 130 mg/dL Final   12/14/2019 2102 188 (H) 70 - 130 mg/dL Final   12/14/2019 1617 132 (H) 70 - 130 mg/dL Final   12/14/2019 1109 153 (H) 70 - 130 mg/dL Final        amLODIPine 10 mg Oral Daily   aspirin 81 mg Oral Daily   citalopram 20 mg Oral Daily   docusate sodium 100 mg Oral BID   furosemide 20 mg Oral Daily   insulin glargine 15 Units Subcutaneous Nightly   insulin lispro 0-14 Units Subcutaneous 4x Daily With Meals & Nightly   ipratropium-albuterol 3 mL Nebulization 4x Daily - RT   losartan 50 mg Oral Q24H   metoprolol tartrate 25 mg Oral BID    pantoprazole 40 mg Oral QAM   pravastatin 40 mg Oral Nightly   sodium chloride 10 mL Intravenous Q12H   tamsulosin 0.4 mg Oral Daily       hold 1 each   Diet Regular; Cardiac, Consistent Carbohydrate, Renal    Intake/Output Summary (Last 24 hours) at 12/16/2019 1153  Last data filed at 12/16/2019 0825  Gross per 24 hour   Intake 240 ml   Output 1375 ml   Net -1135 ml     Assessment/Plan      Active Hospital Problems    Diagnosis  POA   • Acute hypoxemic respiratory failure (CMS/[facility]) [J96.01]  Yes   • Pleural effusion, right [J90]  Yes   • CAD (coronary artery disease) [I25.10]  Yes   • CKD (chronic kidney disease) stage 3, GFR 30-59 ml/min (CMS/[facility]) [N18.3]  Yes   • Lung mass [R91.8]  Yes   • Hypertension [I10]  Yes   • Hyperlipidemia [E78.5]  Yes   • Gastroesophageal reflux disease without esophagitis [K21.9]  Yes   • Diabetes mellitus (CMS/[facility]) [E11.9]  Yes   • Benign prostatic hyperplasia without lower urinary tract symptoms [N40.0]  Yes      Resolved Hospital Problems   No resolved problems to display.     74-year-old admitted with acute on chronic hypoxemic respiratory failure and right pleural effusion    · S/P thoracentesis 12/13/2019 2500 mL.  Awaiting cytology  · Continue diuretics.  Creatinine trending up the last couple of days  · CT today to determine bronchoscopy versus CT-guided biopsy  · May require oxygen on discharge  · Outpatient sleep apnea testing per pulmonology  · Thrombocytopenia: Continue to monitor  · Diabetes mellitus type 2: A1c 6.  Probably could loosen control given age  · +SCDs  · Disposition: To be determined  Discussed with patient and nursing staff.    Luis Felipe Sharma MD  12/16/19  11:51 AM

## 2019-12-16 NOTE — PLAN OF CARE
A&Ox4, up with 1 assist and walker, repeat CT chest done today, RR 20-22, 1.5 L NC, will monitor.

## 2019-12-16 NOTE — PROGRESS NOTES
Continued Stay Note  [facility]     Patient Name: Pablo Collier  MRN: [id number]  Today's Date: 12/16/2019    Admit Date: 12/12/2019    Discharge Plan     Row Name 12/16/19 0909       Plan    Plan  Plan is to return home with his dtr upon DC.  CCP recommended HH for PT and nursing.  Pt will think about it.     Plan Comments  CCP spoke with pt at bedside. He says his dtr is at work today, but will be in this evening.  He confirms plan to return home upon DC.  CCP recommeded HH for PT and nursing.  Provider list given last week.  He will think about it.  He does not have home O2.  If needed, he is in agreement with referral to [facility].  Referral called to [facility].        Discharge Codes    No documentation.             Lexie Lopez RN

## 2019-12-16 NOTE — THERAPY TREATMENT NOTE
Patient Name: Pablo Collier  : 1945    MRN: [id number]                              Today's Date: 2019       Admit Date: 2019    Visit Dx:     ICD-10-CM ICD-9-CM   1. Acute hypoxemic respiratory failure (CMS/HCC) J96.01 518.81   2. Pleural effusion on right J90 511.9   3. Right lower lobe lung mass R91.8 786.6   4. Left adrenal mass (CMS/HCC) E27.8 255.8   5. Decreased mobility and endurance Z74.09 780.99     Patient Active Problem List   Diagnosis   • Diabetes mellitus (CMS/HCC)   • Hypertension   • Hyperlipidemia   • Depression   • Benign prostatic hyperplasia without lower urinary tract symptoms   • Gastroesophageal reflux disease without esophagitis   • Atypical chest pain   • Shortness of breath on exertion   • Acute hypoxemic respiratory failure (CMS/HCC)   • Pleural effusion, right   • CAD (coronary artery disease)   • CKD (chronic kidney disease) stage 3, GFR 30-59 ml/min (CMS/HCC)   • Lung mass     Past Medical History:   Diagnosis Date   • Diabetes mellitus (CMS/HCC)    • Hyperlipidemia    • Hypertension    • Myocardial infarction (CMS/HCC)      Past Surgical History:   Procedure Laterality Date   • COLONOSCOPY  10/30/2019    DR. MARQUISE DE DIOS [place]   • CORONARY ARTERY BYPASS GRAFT      DONE IN [place]   • REPLACEMENT TOTAL KNEE Bilateral     DONE IN [place]     General Information     Row Name 19          PT Evaluation Time/Intention    Document Type  therapy note (daily note)  -DJ     Mode of Treatment  individual therapy;physical therapy  -DJ     Row Name 19          General Information    Patient Profile Reviewed?  yes  -DJ     Existing Precautions/Restrictions  fall  -DJ     Row Name 19          Cognitive Assessment/Intervention- PT/OT    Orientation Status (Cognition)  oriented x 3  -DJ     Row Name 19          Safety Issues, Functional Mobility    Safety Issues Affecting Function (Mobility)  at  risk behavior observed;impulsivity;positioning of assistive device Pt became very impulsive once he saw his bed; pt essentially threw self into bed very unsafe; pt also tends to amb with rollator too far in front of him  -DJ     Impairments Affecting Function (Mobility)  balance;endurance/activity tolerance;strength;shortness of breath  -DJ       User Key  (r) = Recorded By, (t) = Taken By, (c) = Cosigned By    Initials Name Provider Type    Stacy Millard, PT Physical Therapist        Mobility     Row Name 12/16/19 1659          Bed Mobility Assessment/Treatment    Bed Mobility Assessment/Treatment  supine-sit;sit-supine  -DJ     Supine-Sit [place] (Bed Mobility)  minimum assist (75% patient effort);verbal cues  -DJ     Sit-Supine [place] (Bed Mobility)  minimum assist (75% patient effort);verbal cues  -DJ     Assistive Device (Bed Mobility)  bed rails;head of bed elevated  -DJ     Comment (Bed Mobility)  Pt half out of bed upon arrival of PT; supine with R LE off bed  -DJ     Row Name 12/16/19 1659          Transfer Assessment/Treatment    Comment (Transfers)  Pt very unsafe with transferring back to bed - he became very umpulsive once he saw his bed; sit/stand from bedside commode and EOB  -DJ     Row Name 12/16/19 1659          Sit-Stand Transfer    Sit-Stand [place] (Transfers)  contact guard;1 person assist;verbal cues  -DJ     Assistive Device (Sit-Stand Transfers)  walker, 4-wheeled  -DJ     Row Name 12/16/19 1651          Gait/Stairs Assessment/Training    Gait/Stairs Assessment/Training  gait/ambulation assistive device;gait deviations;gait pattern  -DJ     [place] Level (Gait)  contact guard;verbal cues  -DJ     Assistive Device (Gait)  walker, 4-wheeled  -DJ     Distance in Feet (Gait)  80-90'  -DJ     Pattern (Gait)  step-through  -DJ     Deviations/Abnormal Patterns (Gait)  gait speed decreased;stride length decreased  -DJ     Bilateral Gait Deviations  forward flexed posture  rollator is too low so pt leans forward  -DJ     Comment (Gait/Stairs)  Pt req freq vc to keep rollator closer to body; pt fatigues quickly  -DJ       User Key  (r) = Recorded By, (t) = Taken By, (c) = Cosigned By    Initials Name Provider Type    Stacy Millard, MAGGY Physical Therapist        Obj/Interventions     Row Name 12/16/19 1702          Therapeutic Exercise    Comment (Therapeutic Exercise)  HEP deferred due to fatigue   -DJ     Row Name 12/16/19 1702          Static Sitting Balance    Level of [place] (Unsupported Sitting, Static Balance)  supervision  -DJ     Sitting Position (Unsupported Sitting, Static Balance)  sitting on edge of bed  -DJ     Time Able to Maintain Position (Unsupported Sitting, Static Balance)  2 to 3 minutes  -DJ       User Key  (r) = Recorded By, (t) = Taken By, (c) = Cosigned By    Initials Name Provider Type    Stacy Millard, PT Physical Therapist        Goals/Plan    No documentation.       Clinical Impression     Row Name 12/16/19 1703          Pain Assessment    Additional Documentation  Pain Scale: Numbers Pre/Post-Treatment (Group)  -[facility] Name 12/16/19 1703          Pain Scale: Numbers Pre/Post-Treatment    Pain Scale: Numbers, Pretreatment  0/10 - no pain  -DJ     Pain Scale: Numbers, Post-Treatment  0/10 - no pain  -     Row Name 12/16/19 1703          Plan of Care Review    Plan of Care Reviewed With  patient  -DJ     Progress  no change  -DJ     Outcome Summary  Pt half out of bed upon arrival of PT today. Pt with c/o fatigue. Pt amb from bed to bathroom and then amb in hallway 70-80' with rollator that is too short and facilitates poor posture. Pt became fatigued and requested to return to bed. Pt beacme very impulsive when he saw his bed and threw himself into bed very unsafe. Pt woeuld benefit from cont'd PT for endurance, gt/transfer training, HEP and activity tolerance  -     Row Name 12/16/19 1703          Physical Therapy Clinical Impression     Criteria for Skilled Interventions Met (PT Clinical Impression)  yes;treatment indicated  -DJ     Rehab Potential (PT Clinical Summary)  good, to achieve stated therapy goals  -DJ     Row Name 12/16/19 1703          Vital Signs    O2 Delivery Pre Treatment  supplemental O2  -DJ     O2 Delivery Intra Treatment  room air  -DJ     Post SpO2 (%)  93  -DJ     O2 Delivery Post Treatment  supplemental O2  -DJ     Pre Patient Position  Supine  -DJ     Intra Patient Position  Standing  -DJ     Post Patient Position  Supine  -DJ     Row Name 12/16/19 1703          Positioning and Restraints    Pre-Treatment Position  in bed  -DJ     Post Treatment Position  bed  -DJ     In Bed  supine;call light within reach;encouraged to call for assist  -DJ       User Key  (r) = Recorded By, (t) = Taken By, (c) = Cosigned By    Initials Name Provider Type    Stacy Millard, PT Physical Therapist        Outcome Measures     Row Name 12/16/19 1707          How much help from another person do you currently need...    Turning from your back to your side while in flat bed without using bedrails?  3  -DJ     Moving from lying on back to sitting on the side of a flat bed without bedrails?  3  -DJ     Moving to and from a bed to a chair (including a wheelchair)?  3  -DJ     Standing up from a chair using your arms (e.g., wheelchair, bedside chair)?  3  -DJ     Climbing 3-5 steps with a railing?  2  -DJ     To walk in hospital room?  3  -DJ     AM-PAC 6 Clicks Score (PT)  17  -DJ     Row Name 12/16/19 1707          Functional Assessment    Outcome Measure Options  AM-PAC 6 Clicks Basic Mobility (PT)  -DJ       User Key  (r) = Recorded By, (t) = Taken By, (c) = Cosigned By    Initials Name Provider Type    Stacy Millard, PT Physical Therapist          PT Recommendation and Plan     Outcome Summary/Treatment Plan (PT)  Anticipated Discharge Disposition (PT): home with home health, skilled nursing facility  Plan of Care Reviewed With:  patient  Progress: no change  Outcome Summary: Pt half out of bed upon arrival of PT today. Pt with c/o fatigue. Pt amb from bed to bathroom and then amb in hallway 70-80' with rollator that is too short and facilitates poor posture. Pt became fatigued and requested to return to bed. Pt beacme very impulsive when he saw his bed and threw himself into bed very unsafe. Pt woeuld benefit from cont'd PT for endurance, gt/transfer training, HEP and activity tolerance     Time Calculation:   PT Charges     Row Name 12/16/19 1709             Time Calculation    Start Time  1615  -DJ      Stop Time  1635  -DJ      Time Calculation (min)  20 min  -DJ      PT Received On  12/16/19  -DJ      PT - Next Appointment  12/17/19  -DJ        User Key  (r) = Recorded By, (t) = Taken By, (c) = Cosigned By    Initials Name Provider Type    Stacy Millard, MAGGY Physical Therapist        Therapy Charges for Today     Code Description Service Date Service Provider Modifiers Qty    10783928101 HC PT THER PROC EA 15 MIN 12/16/2019 Stacy Hobson PT GP 1          PT G-Codes  Outcome Measure Options: AM-PAC 6 Clicks Basic Mobility (PT)  AM-PAC 6 Clicks Score (PT): 17    Stacy Hobson PT  12/16/2019

## 2019-12-17 PROBLEM — R91.8 RIGHT LOWER LOBE LUNG MASS: Status: ACTIVE | Noted: 2019-01-01

## 2019-12-17 NOTE — PLAN OF CARE
Pt A&Ox4, Thoracentesis today 1450 ml off, bronch to be scheduled tomorrow? NPO @ midnight. 1 L NC satting mid 90's, dyspnea with exertion, will monitor.

## 2019-12-17 NOTE — PROGRESS NOTES
"                                              LOS: 5 days   Patient Care Team:  Ricardo Anderson MD as PCP - General (Family Medicine)    Chief Complaint:  F/up pleural effusion, masslike consolidation and medical problems listed below    Subjective   Interval History  On room air.  Denies cough or shortness of breath at rest but has difficulty breathing on activities.    REVIEW OF SYSTEMS:   CARDIOVASCULAR: No chest pain, chest pressure or chest discomfort. No palpitations but edema which has improved.   Constitutional: No fever or chills        Ventilator/Non-Invasive Ventilation Settings (From admission, onward)    None                Physical Exam:     Vital Signs  Temp:  [97.1 °F (36.2 °C)-98.4 °F (36.9 °C)] 97.1 °F (36.2 °C)  Heart Rate:  [60-78] 67  Resp:  [16-24] 20  BP: (100-155)/(65-78) 111/65    Intake/Output Summary (Last 24 hours) at 12/17/2019 1445  Last data filed at 12/17/2019 1104  Gross per 24 hour   Intake 360 ml   Output 2175 ml   Net -1815 ml     Flowsheet Rows      First Filed Value   Admission Height  185.4 cm (73\") Documented at 12/12/2019 1104   Admission Weight  (!) 145 kg (319 lb) Documented at 12/12/2019 1104          General Appearance:    Alert, cooperative, in no acute distress   ENMT:  Mallampati score 3, moist mucous membrane   Eyes: Pupils equals and reactive to light. EOMI   Neck:   Large. Trachea midline. No thyromegaly.   Lungs:    Decreased air entry in the right lower lobe.  No wheezing.  Nonlabored breathing at rest.  No crackles    Heart:    Regular rhythm and normal rate, normal S1 and S2, no            murmur   Skin:    No abnormalities observed   Abdomen:     Obese. Soft. No tenderness. No HSM.   Neuro:   Conscious, alert, oriented x3. Strength 5/5 in upper and lower ext   Extremities:   Moves all extremities well, mild edema, no cyanosis, no             Redness          Results Review:        Results from last 7 days   Lab Units 12/17/19  0436 12/16/19  1159 " 12/15/19  0603   SODIUM mmol/L 140 137 138   POTASSIUM mmol/L 3.8 3.7 3.8   CHLORIDE mmol/L 99 96* 98   CO2 mmol/L 29.9* 30.5* 29.3*   BUN mg/dL 25* 22 23   CREATININE mg/dL 1.26 1.39* 1.39*   GLUCOSE mg/dL 179* 142* 167*   CALCIUM mg/dL 8.6 8.8 8.3*     Results from last 7 days   Lab Units 12/12/19  1109   TROPONIN T ng/mL <0.010     Results from last 7 days   Lab Units 12/17/19  0436 12/16/19  1159 12/15/19  0603   WBC 10*3/mm3 5.54 6.43 6.71   HEMOGLOBIN g/dL 12.1* 13.1 12.2*   HEMATOCRIT % 35.9* 39.1 36.7*   PLATELETS 10*3/mm3 89* 103* 102*     Results from last 7 days   Lab Units 12/17/19  0436 12/13/19  0604 12/12/19  1109   INR  1.17* 1.21* 1.22*   APTT seconds 33.9 31.6  --      Results from last 7 days   Lab Units 12/12/19  1109   PROBNP pg/mL 394.2       I reviewed the patient's new clinical results.  I personally viewed and interpreted the patient's CXR        Medication Review:     amLODIPine 10 mg Oral Daily   citalopram 20 mg Oral Daily   docusate sodium 100 mg Oral BID   furosemide 20 mg Oral Daily   insulin glargine 15 Units Subcutaneous Nightly   insulin lispro 0-14 Units Subcutaneous 4x Daily With Meals & Nightly   ipratropium-albuterol 3 mL Nebulization 4x Daily - RT   losartan 50 mg Oral Q24H   metoprolol tartrate 25 mg Oral BID   pantoprazole 40 mg Oral QAM   pravastatin 40 mg Oral Nightly   sodium chloride 10 mL Intravenous Q12H   tamsulosin 0.4 mg Oral Daily         hold 1 each   hold 1 each       Diagnostic imaging:  I personally and independently reviewed the following images:  Loculated right pleural effusion.  Underlying pulmonary opacity, mass cannot be ruled out      CT chest 12/16/2019:  Moderate right pleural effusion.  Masslike consolidation in the right lower lobe.  No adenopathies.      Assessment     1. Large, partially, loculated right pleural effusion, s/p right thoracentesis 12/13/19 --> 2500 ml, exudative, cytology normal. 1450 ml on 12/17/19, bloody. Cytology pending  2. Right  lower lobe mass like consolidation  3. COPD without obvious exacerbation  4. Acute versus chronic hypoxia  5. Chronic compensated hypercapnia for respiratory failure.  6. MARIELLE  7. Chronic leg edema  8. Ex-smoker    Relevant Medical Diagnoses  · CAD with history of bypass  · Suspected obstructive sleep apnea  · Diabetes mellitus  · CKD      Plan   · Cytology of the second thoracentesis which was done today is pending.  For cytology was negative  · Bronchoscopy tomorrow at 8 AM.  He had a drink today after thoracentesis  · Continue Lasix 20 mg daily  · I called radiology to discuss whether CT-guided biopsy would be feasible with less risk but I have not heard back.  I will go ahead and keep him on the schedule for bronchoscopy.  I feel this would be less risky for him but unfortunately the yield might be lower.  · I stopped aspirin today      · IS  · PRN DuoNeb  · Would benefit from outpatient evaluation for sleep apnea and likely BiPAP therapy for sleep apnea and chronic hypercapnia    Discussed with staff    Addendum:  Discussed with Dr. Crowell.  He felt like CT-guided biopsy would be more risky compared to bronchoscopy and I agree with that.  He also has some concern about possibly part of this could be atelectasis.  In this instance, we will go ahead and proceed with bronchoscopy tomorrow.  If it is nondiagnostic then will reevaluate again with CT chest in 1 month and consider CT-guided biopsy if persistent consolidation    Tabitha Rutledge MD  12/17/19  2:45 PM        This note was dictated utilizing [employer] dictation

## 2019-12-17 NOTE — PLAN OF CARE
Problem: Patient Care Overview  Goal: Plan of Care Review  Flowsheets (Taken 12/17/2019 0449)  Progress: improving  Plan of Care Reviewed With: patient  Outcome Summary: VSS. Patient on 1.5 L oxygen. Plan is for thoracentesis today and possible bronch. Labs for the morning. Will continue to monitor.     Problem: Skin Injury Risk (Adult)  Goal: Skin Health and Integrity  Outcome: Ongoing (interventions implemented as appropriate)  Flowsheets (Taken 12/17/2019 0449)  Skin Health and Integrity: making progress toward outcome     Problem: Breathing Pattern Ineffective (Adult)  Goal: Effective Oxygenation/Ventilation  Outcome: Ongoing (interventions implemented as appropriate)  Flowsheets (Taken 12/17/2019 0449)  Effective Oxygenation/Ventilation: making progress toward outcome     Problem: Kidney Disease, Chronic/End Stage Renal Disease (Adult)  Goal: Signs and Symptoms of Listed Potential Problems Will be Absent, Minimized or Managed (Kidney Disease, Chronic/End Stage Renal Disease)  Outcome: Ongoing (interventions implemented as appropriate)     Problem: Fall Risk (Adult)  Goal: Absence of Fall  Outcome: Ongoing (interventions implemented as appropriate)  Flowsheets (Taken 12/17/2019 0449)  Absence of Fall: making progress toward outcome

## 2019-12-17 NOTE — PROGRESS NOTES
"   LOS: 5 days   Patient Care Team:  Ricardo Anderson MD as PCP - General (Family Medicine)    Chief Complaint: pain with deep inspiration on right    Subjective     Feeling better today. Still gets SOA with exertion. C/o some pain in right chest with cough or deep breath.       Subjective:  Symptoms:  Stable.  He reports shortness of breath, chest pain and weakness.  No malaise, cough, headache, chest pressure, anorexia, diarrhea or anxiety.    Diet:  Adequate intake.  No nausea or vomiting.    Activity level: Normal.    Pain:  He complains of pain that is mild.  He reports pain is improving.  Pain is well controlled.        History taken from: patient chart RN    Objective     Vital Signs  Temp:  [97.1 °F (36.2 °C)-98.4 °F (36.9 °C)] 97.1 °F (36.2 °C)  Heart Rate:  [60-78] 66  Resp:  [16-28] 26  BP: (100-155)/(65-78) 111/65    Objective:  General Appearance:  Comfortable and in no acute distress.    Vital signs: (most recent): Blood pressure 111/65, pulse 72, temperature 97.1 °F (36.2 °C), temperature source Oral, resp. rate 26, height 185.4 cm (73\"), weight 132 kg (291 lb 6.4 oz), SpO2 97 %.  Vital signs are normal.  No fever.    Output: Producing urine.    HEENT: Normal HEENT exam.    Lungs:  Normal effort and normal respiratory rate.  Breath sounds clear to auscultation.    Heart: Normal rate.  Regular rhythm.    Abdomen: Abdomen is soft.  Bowel sounds are normal.   There is no abdominal tenderness.     Extremities: There is venous stasis and dependent edema (trace, chronic).    Pulses: Distal pulses are intact.    Neurological: Patient is alert and oriented to person, place and time.  Patient has normal muscle tone.    Skin:  Warm and dry.              Results Review:     I reviewed the patient's new clinical results.  I reviewed the patient's new imaging results and agree with the interpretation.  I reviewed the patient's other test results and agree with the interpretation  I personally viewed and " interpreted the patient's EKG/Telemetry data  Discussed with pt and RN    Results from last 7 days   Lab Units 12/17/19  0436 12/16/19  1159 12/15/19  0603 12/13/19  0604 12/12/19  1109 12/11/19  1353   WBC 10*3/mm3 5.54 6.43 6.71 4.68 5.46 6.86   HEMOGLOBIN g/dL 12.1* 13.1 12.2* 12.4* 13.1 13.5   PLATELETS 10*3/mm3 89* 103* 102* 107* 141 145     Results from last 7 days   Lab Units 12/17/19  0436 12/16/19  1159 12/15/19  0603 12/14/19  0536 12/13/19  0604 12/12/19  1109 12/11/19  1353   SODIUM mmol/L 140 137 138 139 140 141  136 137   POTASSIUM mmol/L 3.8 3.7 3.8 3.8 3.9 4.3  4.1 4.0   CHLORIDE mmol/L 99 96* 98 98 100 98  98 96*   CO2 mmol/L 29.9* 30.5* 29.3* 31.7* 30.7* 28.3  28.1 32.7*   BUN mg/dL 25* 22 23 20 16 20  20 20   CREATININE mg/dL 1.26 1.39* 1.39* 1.29* 1.25 1.43*  1.37* 1.31*   CALCIUM mg/dL 8.6 8.8 8.3* 8.5* 8.9 9.0  9.4 9.4   Estimated Creatinine Clearance: 74.6 mL/min (by C-G formula based on SCr of 1.26 mg/dL).    Medication Review: reviewed     Assessment/Plan       Right lower lobe lung mass    Diabetes mellitus (CMS/HCC)    Hypertension    Hyperlipidemia    Benign prostatic hyperplasia without lower urinary tract symptoms    Gastroesophageal reflux disease without esophagitis    Acute hypoxemic respiratory failure (CMS/HCC)    Pleural effusion, right    CAD (coronary artery disease)    CKD (chronic kidney disease) stage 3, GFR 30-59 ml/min (CMS/HCC)    Lung mass          Plan:   (74-year-old admitted with acute on chronic hypoxemic respiratory failure and right pleural effusion     · S/P thoracentesis 12/13/2019 2500 mL, exudative, cytology okay. Repeat thoracentesis today 1450ml, bloody, cytology pending.  · Continue oral diuretics.  Creatinine slightly improved today.  · Pulm has d/w Rad and plans bronch tomorrow as CT guided needle biopsy would be too risky  · May require oxygen on discharge  · Outpatient sleep apnea testing per Pulmonology  · Thrombocytopenia: Continue to  monitor  · Diabetes mellitus type 2: A1c 6.  Probably could loosen control given age, sugars acceptable at present on lower dose of long-acting insulin, Actos on hold given pleural effusion, continue SSI   · SCDs for DVT ppx  · Disposition: To be determined).       Kojo Norris MD  12/17/19  5:39 PM    Time: 20min

## 2019-12-17 NOTE — DISCHARGE PLACEMENT REQUEST
"Pablo Ray (74 y.o. Male)     Date of Birth Social Security Number Address Home Phone MRN    1945  [address]  [place] [id number] [phone] [id number]    B[employer] Marital Status          Unknown        Admission Date Admission Type Admitting Provider Attending Provider Department, Room/Bed    12/12/19 Emergency Robles [place]ton MD Benton, John B, MD [address], [address]    Discharge Date Discharge Disposition Discharge Destination                       Attending Provider:  Kojo Norris MD    Allergies:  No Known Allergies    Isolation:  None   Infection:  None   Code Status:  CPR    Ht:  185.4 cm (73\")   Wt:  132 kg (291 lb 6.4 oz)    Admission Cmt:  None   Principal Problem:  None                Active Insurance as of 12/12/2019     Primary Coverage     Payor Plan Insurance Group Employer/Plan Group    HUMANA MEDICARE REPLACEMENT HUMANA MEDICARE REPLACEMENT [id number]     Payor Plan Address Payor Plan Phone Number Payor Plan Fax Number Effective Dates    [address] [address] [phone]  1/1/2019 - None Entered    [facility] [id number]       Subscriber Name Subscriber Birth Date Member ID       PABLO RAY 1945 [id number]                 Emergency Contacts      (Rel.) Home Phone Work Phone Mobile Phone    GARRETT RAY (Daughter) -- -- [phone]              "

## 2019-12-17 NOTE — PROGRESS NOTES
Continued Stay Note  [facility]     Patient Name: Pablo Collier  MRN: [id number]  Today's Date: 12/17/2019    Admit Date: 12/12/2019    Discharge Plan     Row Name 12/17/19 0758       Plan    Plan  Plan is home with his dtr upon DC.  Referral to [facility] for PT and nursing.  Martín is following in case new home O2 is needed.     Plan Comments  PT recommends HH or SNF.  CCP spoke with pt at bedside.  Discussed DC options including HH and SNF.  Pt declines SNF, but is in agreement with HH.  Provider list given, pt has no preference of HH agency and is in agreement with [employer] .  Referral to [facility] Intake.          Discharge Codes    No documentation.             Lexie Lopez RN

## 2019-12-18 NOTE — ANESTHESIA PREPROCEDURE EVALUATION
Anesthesia Evaluation                  Airway   Mallampati: II  TM distance: >3 FB  Neck ROM: full  No difficulty expected  Dental    (+) edentulous    Pulmonary    (+) shortness of breath, sleep apnea, rhonchi, decreased breath sounds, rales,   Cardiovascular - normal exam    (+) hypertension, past MI , CAD, CABG, hyperlipidemia,       Neuro/Psych  (+) psychiatric history Depression,     GI/Hepatic/Renal/Endo    (+) obesity,  GERD,  renal disease, diabetes mellitus using insulin,     Musculoskeletal     Abdominal    Substance History      OB/GYN          Other                        Anesthesia Plan    ASA 4     general     intravenous induction     Anesthetic plan, all risks, benefits, and alternatives have been provided, discussed and informed consent has been obtained with: patient.

## 2019-12-18 NOTE — ANESTHESIA PROCEDURE NOTES
Airway  Urgency: elective    Date/Time: 12/18/2019 8:23 AM  Airway not difficult    General Information and Staff    Patient location during procedure: OR  Anesthesiologist: Mariah Patrick MD    Indications and Patient Condition  Indications for airway management: airway protection    Preoxygenated: yes  MILS maintained throughout  Mask difficulty assessment: 1 - vent by mask    Final Airway Details  Final airway type: supraglottic airway      Successful airway: classic  Size 5    Number of attempts at approach: 1  Assessment: lips, teeth, and gum same as pre-op

## 2019-12-18 NOTE — PLAN OF CARE
Problem: Patient Care Overview  Goal: Plan of Care Review  Outcome: Ongoing (interventions implemented as appropriate)  Flowsheets (Taken 12/18/2019 0448)  Progress: no change  Plan of Care Reviewed With: patient  Outcome Summary: VSS. Patient remained on room air overnight. Plan is for Bronchoscopy today. Will continue to monitor.  Problem: Fall Risk (Adult)  Goal: Absence of Fall  Outcome: Ongoing (interventions implemented as appropriate)  Flowsheets (Taken 12/18/2019 0448)  Absence of Fall: making progress toward outcome     Problem: Diabetes, Type 2 (Adult)  Goal: Signs and Symptoms of Listed Potential Problems Will be Absent, Minimized or Managed (Diabetes, Type 2)  Outcome: Ongoing (interventions implemented as appropriate)  Flowsheets (Taken 12/18/2019 0448)  Problems Present (Type 2 Diabetes): situational response; hyperglycemia     Problem: Breathing Pattern Ineffective (Adult)  Goal: Effective Oxygenation/Ventilation  Outcome: Ongoing (interventions implemented as appropriate)  Flowsheets (Taken 12/18/2019 0448)  Effective Oxygenation/Ventilation: making progress toward outcome     Problem: Skin Injury Risk (Adult)  Goal: Skin Health and Integrity  Outcome: Ongoing (interventions implemented as appropriate)  Flowsheets (Taken 12/18/2019 0448)  Skin Health and Integrity: making progress toward outcome     Problem: Kidney Disease, Chronic/End Stage Renal Disease (Adult)  Goal: Signs and Symptoms of Listed Potential Problems Will be Absent, Minimized or Managed (Kidney Disease, Chronic/End Stage Renal Disease)  Outcome: Ongoing (interventions implemented as appropriate)

## 2019-12-18 NOTE — ANESTHESIA POSTPROCEDURE EVALUATION
Patient: Pablo Collier    Procedure Summary     Date:  12/18/19 Room / Location:   [facility]    Anesthesia Start:  0817 Anesthesia Stop:  0919    Procedure:  BRONCHOSCOPY WITH FLUORO with BAL and biopsies (Bilateral Bronchus) Diagnosis:       Right lower lobe lung mass      (Right lower lobe lung mass [R91.8])    Surgeon:  Tabitha Rutledge MD Provider:  Mariah Patrick MD    Anesthesia Type:  general ASA Status:  4          Anesthesia Type: general    Vitals  No vitals data found for the desired time range.          Post Anesthesia Care and Evaluation    Patient location during evaluation: bedside  Patient participation: complete - patient participated  Level of consciousness: awake  Pain management: adequate  Airway patency: patent  Anesthetic complications: No anesthetic complications    Cardiovascular status: acceptable  Respiratory status: acceptable  Hydration status: acceptable

## 2019-12-18 NOTE — PROGRESS NOTES
"                                              LOS: 6 days   Patient Care Team:  Ricardo Anderson MD as PCP - General (Family Medicine)    Chief Complaint:  F/up pleural effusion, masslike consolidation and medical problems listed below    Subjective   Interval History  Mild dry cough.  On room air.  No shortness of breath at rest but appears to have some difficulty breathing when he walks.  I saw him before and after bronchoscopy.  He did not have significant trouble breathing or hemoptysis after bronchoscopy.    REVIEW OF SYSTEMS:   CARDIOVASCULAR: No chest pain, chest pressure or chest discomfort. No palpitations but edema which has improved.   Constitutional: No fever or chills        Ventilator/Non-Invasive Ventilation Settings (From admission, onward)    None                Physical Exam:     Vital Signs  Temp:  [97.1 °F (36.2 °C)-99.7 °F (37.6 °C)] 98.6 °F (37 °C)  Heart Rate:  [60-73] 70  Resp:  [16-28] 20  BP: (111-148)/(60-77) 148/75    Intake/Output Summary (Last 24 hours) at 12/18/2019 0831  Last data filed at 12/18/2019 0017  Gross per 24 hour   Intake 240 ml   Output 1900 ml   Net -1660 ml     Flowsheet Rows      First Filed Value   Admission Height  185.4 cm (73\") Documented at 12/12/2019 1104   Admission Weight  (!) 145 kg (319 lb) Documented at 12/12/2019 1104          General Appearance:    Alert, cooperative, in no acute distress   ENMT:  Mallampati score 3, moist mucous membrane   Eyes: Pupils equals and reactive to light. EOMI   Neck:   Large. Trachea midline. No thyromegaly.   Lungs:    Decreased air entry in the right lower lobe.  No wheezing.  Nonlabored breathing at rest.  No crackles    Heart:    Regular rhythm and normal rate, normal S1 and S2, no            murmur   Skin:    No abnormalities observed   Abdomen:     Obese. Soft. No tenderness. No HSM.   Neuro:   Conscious, alert, oriented x3. Strength 5/5 in upper and lower ext   Extremities:   Moves all extremities well, mild edema, no " cyanosis, no             Redness          Results Review:        Results from last 7 days   Lab Units 12/18/19  0515 12/17/19  0436 12/16/19  1159   SODIUM mmol/L 138 140 137   POTASSIUM mmol/L 4.2 3.8 3.7   CHLORIDE mmol/L 97* 99 96*   CO2 mmol/L 30.4* 29.9* 30.5*   BUN mg/dL 24* 25* 22   CREATININE mg/dL 1.34* 1.26 1.39*   GLUCOSE mg/dL 159* 179* 142*   CALCIUM mg/dL 8.8 8.6 8.8     Results from last 7 days   Lab Units 12/12/19  1109   TROPONIN T ng/mL <0.010     Results from last 7 days   Lab Units 12/18/19  0515 12/17/19  0436 12/16/19  1159   WBC 10*3/mm3 6.68 5.54 6.43   HEMOGLOBIN g/dL 12.9* 12.1* 13.1   HEMATOCRIT % 40.1 35.9* 39.1   PLATELETS 10*3/mm3 133* 89* 103*     Results from last 7 days   Lab Units 12/17/19  0436 12/13/19  0604 12/12/19  1109   INR  1.17* 1.21* 1.22*   APTT seconds 33.9 31.6  --      Results from last 7 days   Lab Units 12/12/19  1109   PROBNP pg/mL 394.2       I reviewed the patient's new clinical results.  I personally viewed and interpreted the patient's CXR        Medication Review:     amLODIPine 10 mg Oral Daily   citalopram 20 mg Oral Daily   docusate sodium 100 mg Oral BID   furosemide 20 mg Oral Daily   insulin glargine 15 Units Subcutaneous Nightly   insulin lispro 0-14 Units Subcutaneous 4x Daily With Meals & Nightly   ipratropium-albuterol 3 mL Nebulization 4x Daily - RT   losartan 50 mg Oral Q24H   metoprolol tartrate 25 mg Oral BID   pantoprazole 40 mg Oral QAM   pravastatin 40 mg Oral Nightly   sodium chloride 10 mL Intravenous Q12H   tamsulosin 0.4 mg Oral Daily         hold 1 each    hold 1 each    sodium chloride 30 mL/hr Last Rate: 30 mL/hr (12/18/19 0810)       Diagnostic imaging:  I personally and independently reviewed the following images:  Loculated right pleural effusion.  Underlying pulmonary opacity, mass cannot be ruled out      CT chest 12/16/2019:  Moderate right pleural effusion.  Masslike consolidation in the right lower lobe.  No  adenopathies.      Assessment     1. Large, partially, loculated right pleural effusion, s/p right thoracentesis 12/13/19 --> 2500 ml, exudative, cytology normal. 1450 ml on 12/17/19, bloody, Cytology pending  2. Right lower lobe mass like consolidation, status post BAL and transbronchial biopsies on 12/18/2019  3. COPD without obvious exacerbation  4. Acute versus chronic hypoxia  5. Chronic compensated hypercapnia for respiratory failure.  6. MARIELLE  7. Chronic leg edema  8. Ex-smoker    Relevant Medical Diagnoses  · CAD with history of bypass  · Suspected obstructive sleep apnea  · Diabetes mellitus  · CKD      Plan   · Awaiting cytology #2 on the pleural fluid, BAL cytology and transbronchial biopsy collected from the superior segment of the right lower lobe, area of masslike consolidation.  · Continue Lasix 20 mg daily  · Resume aspirin on discharge  · Would benefit from outpatient evaluation for sleep apnea and likely BiPAP therapy for sleep apnea and chronic hypercapnia    Okay to discharge home.  Walking oximetry today prior to discharge and will arrange for oxygen if needed.  He can continue his home inhalers as outpatient and no need for additional treatment.  Discussed with Dr. Norris    Follow-up as outpatient placed.      Tabitha Rutledge MD  12/18/19  8:31 AM        This note was dictated utilizing Dragon dictation

## 2019-12-18 NOTE — PROGRESS NOTES
Exercise Oximetry    Patient Name:Pablo Collier   MRN: [id number]   Date: 12/18/19             ROOM AIR BASELINE   SpO2%   Heart Rate    Blood Pressure      EXERCISE ON ROOM AIR SpO2% EXERCISE ON O2 @ 2  LPM SpO2%   1 MINUTE 87 1 MINUTE    2 MINUTES  2 MINUTES 92   3 MINUTES  3 MINUTES 94   4 MINUTES  4 MINUTES 93   5 MINUTES  5 MINUTES 92   6 MINUTES  6 MINUTES 94              Distance Walked  Walked from room to nurses station and down one luna and back Distance Walked   Dyspnea (Florinda Scale)   Dyspnea (Florinda Scale)   Fatigue (Florinda Scale)   Fatigue (Florinda Scale)   SpO2% Post Exercise  92 SpO2% Post Exercise   HR Post Exercise  70 HR Post Exercise   Time to Recovery  2 mins Time to Recovery     Comments: Pt walked out of his room on RA and didn't get but a minute into it and he dropped to 87 and sat down on his walker. Patient was sob and fatigued, so placed on 2L and walked the rest of the time on 2 L and he held his sats in the lower 90s. Still was worn out but did better with the O2. Patient does qualify for home O2 at this time on 2 L. Thanks Respiratory

## 2019-12-18 NOTE — SIGNIFICANT NOTE
12/18/19 9371   Rehab Treatment   Discipline physical therapist   Reason Treatment Not Performed unavailable for treatment  (Pt walking with RT in hallway; pt and RT report that hever is on her way to pick pt up for d/c. PT will f/u with pt 12/19 if he is still here)   Recommendation   PT - Next Appointment 12/19/19

## 2019-12-18 NOTE — PROGRESS NOTES
Continued Stay Note  [facility]     Patient Name: Pablo Collier  MRN: [id number]  Today's Date: 12/18/2019    Admit Date: 12/12/2019    Discharge Plan     Row Name 12/18/19 1801       Plan    Plan Comments  Plans are home with  HH// pt will need home o2.  Spoke with Trista /Clarita, and they received the fax and will bring tank to pt for dc. Zahida Martini RN        Discharge Codes    No documentation.       Expected Discharge Date and Time     Expected Discharge Date Expected Discharge Time    Dec 18, 2019             Zahida Martini RN

## 2019-12-18 NOTE — DISCHARGE SUMMARY
Patient Name: Pablo Collier  : 1945  MRN: [id number]    Date of Admission: 2019  Date of Discharge:  2019  Primary Care Physician: Ricardo Anderson MD      Chief Complaint:   Shortness of Breath (Pt had xray done at PCP's office yesterday and was called today and told it was abnormal. Complains of SOA.)      Discharge Diagnoses     Active Hospital Problems    Diagnosis  POA   • **Right lower lobe lung mass [R91.8]  Yes   • Acute hypoxemic respiratory failure (CMS/HCC) [J96.01]  Yes   • Pleural effusion, right [J90]  Yes   • CAD (coronary artery disease) [I25.10]  Yes   • CKD (chronic kidney disease) stage 3, GFR 30-59 ml/min (CMS/[facility]) [N18.3]  Yes   • Hypertension [I10]  Yes   • Hyperlipidemia [E78.5]  Yes   • Gastroesophageal reflux disease without esophagitis [K21.9]  Yes   • Diabetes mellitus (CMS/[facility]) [E11.9]  Yes   • Benign prostatic hyperplasia without lower urinary tract symptoms [N40.0]  Yes      Resolved Hospital Problems   No resolved problems to display.        Hospital Course     Mr. Collier is a 74 y.o. former smoker with a history of DM2, CAD s/p CABG, HTN and HLD that presented to [facility] complaining of shortness of breath. Please see below for details of admission:     74-year-old admitted with acute on chronic hypoxemic respiratory failure and right pleural effusion     ·           S/P thoracentesis 2019 2500 mL, exudative, cytology okay. Repeat thoracentesis today 1450ml, bloody, cytology pending.  ·           Continue oral diuretics (Lasix).  Creatinine stable today.  ·           Underwent bronchoscopy today with biopsy of right lung mass  ·           Will require oxygen on discharge  ·           Outpatient sleep apnea testing needed per Pulmonology  ·           Thrombocytopenia: Continue to monitor, stable today  ·           Diabetes mellitus type 2: A1c 6.  Probably could loosen control given age, sugars acceptable at present on lower dose of  long-acting insulin, Actos on hold indefinitely given recurrent pleural effusion    Okay for discharge home today per my d/w Dr. Rutledge  He will be followed by [facility]  He will need to f/u with Dr. Rutledge regarding his resp function, possible YINA, and results of biopsy here.  He was counseled to return to ER for any worsening SOA.  Pt is very eager to go home today.    Day of Discharge     Feeling okay as long as he wears O2 at 2L/min. Eager to go home.     Physical Exam:  Temp:  [98.1 °F (36.7 °C)-99.7 °F (37.6 °C)] 98.2 °F (36.8 °C)  Heart Rate:  [64-73] 72  Resp:  [18-22] 20  BP: ()/(57-77) 99/57  Body mass index is 37.96 kg/m².  Physical Exam  General Appearance:    Alert, cooperative, in no acute distress   ENMT:  Mallampati score 3, moist mucous membrane   Eyes: Pupils equals and reactive to light. EOMI   Neck:   Large. Trachea midline. No thyromegaly.   Lungs:    Decreased air entry in the right lower lobe.  No wheezing.  Nonlabored breathing at rest.  No crackles    Heart:    Regular rhythm and normal rate, normal S1 and S2, no            murmur   Skin:    No abnormalities observed   Abdomen:     Obese. Soft. No tenderness. No HSM.   Neuro:   Conscious, alert, oriented x3. Strength 5/5 in upper and lower ext   Extremities:   Moves all extremities well, mild edema, no cyanosis, no             Redness       Consultants     Consult Orders (all) (From admission, onward)     Start     Ordered    12/12/19 1626  Inpatient Case Management  Consult  Once     Provider:  (Not yet assigned)    12/12/19 1626    12/12/19 1615  Inpatient Consult to Advance Care Planning  Once     Provider:  (Not yet assigned)    12/12/19 1626    12/12/19 1433  Inpatient Pulmonology Consult  Once     Specialty:  Pulmonary Disease  Provider:  Virgil Moreno MD    12/12/19 1433    12/12/19 1345  [facility] (on-call MD unless specified) Details  Once     Specialty:  Hospitalist  Provider:  (Not yet assigned)     12/12/19 1344              Procedures     BRONCHOSCOPY WITH FLUORO with BAL and biopsies      Imaging Results (All)     Procedure Component Value Units Date/Time    XR Chest 1 View [[id number]] Collected:  12/18/19 0923     Updated:  12/18/19 0928    Narrative:       PORTABLE CHEST     CLINICAL HISTORY: Pleural effusions. Probable pleural-based mass within  the right lower lobe seen on recent CT imaging.     2 spot images of the right lung base demonstrate a bronchoscope in  place. One of the images and biceps forceps extend from the scope toward  the lateral pleural surface.     Total fluoroscopy time was 1 minute 10 seconds     This report was finalized on 12/18/2019 9:25 AM by Dr. Tuan Heart M.D.       FL C Arm During Surgery [[id number]] Resulted:  12/18/19 0918     Updated:  12/18/19 0918    Narrative:       This procedure was auto-finalized with no dictation required.    US Thoracentesis [[id number]] Collected:  12/17/19 1154    Specimen:  Body Fluid Updated:  12/17/19 1204    Narrative:       ULTRASOUND-GUIDED RIGHT THORACENTESIS. 12/17/2019     HISTORY: Pleural effusion.     After signed informed consent was obtained the patient was prepped and  draped in the upright sitting position. Lidocaine was used for local  anesthesia.     Ultrasound guidance was used to place the thoracentesis catheter into  the right pleural fluid collection. 1450 cc was removed.     Confirmatory images were obtained.     Patient tolerated the procedure well with no complications.       Impression:       Ultrasound-guided right thoracentesis as described.     This report was finalized on 12/17/2019 12:01 PM by Dr. Saleem Crowell M.D.       CT Chest Without Contrast [[id number]] Collected:  12/16/19 1755     Updated:  12/16/19 1818    Narrative:       CT CHEST WITHOUT CONTRAST     HISTORY: Workup for possible biopsy. Respiratory failure. Pleural  effusion with possible lung mass.     TECHNIQUE: Chest CT includes axial imaging  from the thoracic inlet to  the upper abdomen without IV contrast     COMPARISON: Chest CT with IV contrast 12/12/2019, CT chest 04/02/2019     FINDINGS: Since the CT 4 days ago there has been an ultrasound-guided  right thoracentesis with withdrawal of 2.5 L of fluid. There is  persistent moderate right pleural effusion that extends partially into  the major fissure. Within the right lower lobe there is a dense area of  opacity that does not contain air bronchograms and measures  approximately 8 x 4.7 cm in axial dimension and this is similar to the  prior study. There is linear opacity within the superior segment left  lower lobe without change and this may be associated with atelectasis.  No new airspace disease has developed. There is a small left pleural  effusion. A subcarinal node measures 3.9 cm short axis. Additional small  midsternal nodes are present without enlargement. There is no axillary  tien enlargement. There has been a previous median sternotomy. Within  the left upper abdomen there is a 4 x 3.3 cm left adrenal mass.     There is multilevel Schmorl's node formation within the thoracic spine.  Within the left aspect of the T7 vertebral body there is an area of  low-density that most likely represents a hemangioma and does not appear  changed compared to 04/02/2019. Multilevel bridging and endplate spur  formation is present within the thoracic spine.       Impression:       1. Since CT 4 days ago there has been an ultrasound guided right  thoracentesis with withdrawal of 2.5 L pleural fluid. There is moderate  residual right pleural effusion. There is improved aeration of the right  lower lobe although there remains dense right lower lobe/infrahilar  opacity in part due to atelectatic/consolidated lung though there is  potential a component of a mass extends to the right hemidiaphragm.  Subsegmental atelectasis superior segment left lower lobe with small  left pleural effusion.  2. 4 x 3.3 cm left  adrenal mass.  3. Previous median sternotomy.      Radiation dose reduction techniques were utilized, including automated  exposure control and exposure modulation based on body size.     This report was finalized on 12/16/2019 6:15 PM by Dr. Nickolas Naayk M.D.       US Thoracentesis [[id number]] Collected:  12/13/19 1408    Specimen:  Body Fluid Updated:  12/13/19 1417    Narrative:       ULTRASOUND-GUIDED RIGHT THORACENTESIS. 12/13/2019     HISTORY: Pleural effusion.     After signed informed consent was obtained the patient was prepped and  draped in the upright sitting position. Lidocaine was used for local  anesthesia.     Ultrasound guidance was used to place the thoracentesis catheter into  the right pleural fluid collection. 2500 cc was removed.     Confirmatory images were obtained.     Patient tolerated the procedure well with no complications.       Impression:       Ultrasound-guided right thoracentesis as described.     This report was finalized on 12/13/2019 2:14 PM by Dr. Saleem Crowell M.D.       CT Chest With Contrast [[id number]] Collected:  12/12/19 1433     Updated:  12/13/19 1106    Narrative:       CT CHEST WITH IV CONTRAST     HISTORY: 74-year-old male with hypoxia and a new pleural effusion.     TECHNIQUE: Radiation dose reduction techniques were utilized, including  automated exposure control and exposure modulation based on body size.   3 mm images were obtained through the chest after the administration of  IV contrast. Compared with yesterday's chest radiograph and chest CTA  from 04/02/2019.     FINDINGS: The right lower lobe is completely collapsed and has a rounded  masslike appearance. The right lower lobe bronchi are predominantly  within the superior medial aspect of the collapsed right lower lobe.  There is a moderately large right pleural effusion and effusion is  mildly loculated. There is mild pleural thickening at the right lower  thorax. There is also significant  atelectatic change at the right middle  lobe and pleural thickening along the lateral aspect of the right middle  lobe. There is chronic appearing scarring at the left lower lobe with  volume loss which appears similar. There is stable mild pleural  thickening at the posterior aspect of the left thorax. There are no  calcified pleural plaques. There are shotty mediastinal nodes. No  definite pathologic lymphadenopathy is seen. There is a 4 cm left  adrenal nodule which appears largely unchanged.       Impression:       1. The right lower lobe is completely collapsed and an obstructing mass  is suspected. There is a moderately large right pleural effusion which  is partially loculated and there is also mild pleural thickening along  the posterior aspect of the right lower thorax as well as at the lateral  aspect of the right middle lobe.  2. There is stable chronic-appearing scarring and volume loss at the  left lower lobe and there is also stable mild pleural thickening at the  posterior aspect of the left thorax.  3. Stable approximately 4 cm left adrenal nodule.     This report was finalized on 12/13/2019 11:03 AM by Dr. Vijaya Bass M.D.           Results for orders placed during the hospital encounter of 12/12/19   Duplex Venous Lower Extremity - Bilateral CAR    Narrative · Normal bilateral lower extremity venous duplex scan.        Results for orders placed during the hospital encounter of 12/12/19   Adult Transthoracic Echo Complete W/ Cont if Necessary Per Protocol    Narrative · Left ventricular systolic function is hyperdynamic (EF > 70).  · Left ventricular wall thickness is consistent with moderate-to-severe   concentric hypertrophy.  · Left ventricular diastolic dysfunction (grade II) consistent with   pseudonormalization.  · There is calcification of the aortic valve.  · There is a solitary cyst/mass noted in the liver. Consider further   imaging/clinical correlation required        Pertinent Labs      Results from last 7 days   Lab Units 12/18/19  0515 12/17/19  0436 12/16/19  1159 12/15/19  0603   WBC 10*3/mm3 6.68 5.54 6.43 6.71   HEMOGLOBIN g/dL 12.9* 12.1* 13.1 12.2*   PLATELETS 10*3/mm3 133* 89* 103* 102*     Results from last 7 days   Lab Units 12/18/19  0515 12/17/19  0436 12/16/19  1159 12/15/19  0603   SODIUM mmol/L 138 140 137 138   POTASSIUM mmol/L 4.2 3.8 3.7 3.8   CHLORIDE mmol/L 97* 99 96* 98   CO2 mmol/L 30.4* 29.9* 30.5* 29.3*   BUN mg/dL 24* 25* 22 23   CREATININE mg/dL 1.34* 1.26 1.39* 1.39*   GLUCOSE mg/dL 159* 179* 142* 167*   Estimated Creatinine Clearance: 69.4 mL/min (A) (by C-G formula based on SCr of 1.34 mg/dL (H)).  Results from last 7 days   Lab Units 12/18/19  0515 12/13/19  0604 12/12/19  1109   ALBUMIN g/dL 3.20* 3.60 4.10   BILIRUBIN mg/dL 0.7 0.6 0.7   ALK PHOS U/L 77 60 67   AST (SGOT) U/L 21 20 22   ALT (SGPT) U/L 21 18 17     Results from last 7 days   Lab Units 12/18/19  0515 12/17/19  0436 12/16/19  1159 12/15/19  0603  12/13/19  0604 12/12/19  1109   CALCIUM mg/dL 8.8 8.6 8.8 8.3*   < > 8.9 9.0  9.4   ALBUMIN g/dL 3.20*  --   --   --   --  3.60 4.10    < > = values in this interval not displayed.       Results from last 7 days   Lab Units 12/12/19  1109   TROPONIN T ng/mL <0.010   PROBNP pg/mL 394.2           Invalid input(s): LDLCALC  Results from last 7 days   Lab Units 12/13/19  1310 12/12/19  1149 12/12/19  1141   BLOODCX   --  No growth at 5 days No growth at 5 days   BODYFLDCX  No growth at 5 days  --   --        Test Results Pending at Discharge      Order Current Status    AFB Culture - Lavage, Lung, R In process    Fungus Culture - Lavage, Lung, R In process    Non-gynecologic Cytology In process    Tissue Pathology Exam In process    BAL Culture, Quantitative - Lavage, Lung, R Preliminary result          Discharge Details        Discharge Medications      New Medications      Instructions Start Date   furosemide 20 MG tablet  Commonly known as:  LASIX   20 mg,  Oral, Daily   Start Date:  December 19, 2019     losartan 50 MG tablet  Commonly known as:  COZAAR   50 mg, Oral, Every 24 Hours Scheduled   Start Date:  December 19, 2019        Changes to Medications      Instructions Start Date   amLODIPine 10 MG tablet  Commonly known as:  NORVASC  What changed:  how much to take   5 mg, Oral, Daily      insulin detemir 100 UNIT/ML injection  Commonly known as:  LEVEMIR FLEXTOUCH  What changed:  how much to take   20 Units, Subcutaneous, Nightly         Continue These Medications      Instructions Start Date   albuterol sulfate  (90 Base) MCG/ACT inhaler  Commonly known as:  PROVENTIL HFA;VENTOLIN HFA;PROAIR HFA   2 puffs, Inhalation, Every 4 Hours PRN      aspirin 81 MG EC tablet   81 mg, Oral, Daily      cholecalciferol 25 MCG (1000 UT) tablet  Commonly known as:  VITAMIN D3   1,000 Units, Oral, Daily      citalopram 20 MG tablet  Commonly known as:  CeleXA   20 mg, Oral, Daily      glucose blood test strip  Commonly known as:  ACCU-CHEK GUNNER   USE TO TEST BLOOD SUGARS THREE TIMES DAILY AS DIRECTED BY PRESCRIBER DX: E11.00      Insulin Pen Needle 32G X 6 MM misc  Commonly known as:  NOVOFINE   Use with insulin      metoprolol tartrate 25 MG tablet  Commonly known as:  LOPRESSOR   25 mg, Oral, 2 Times Daily      pantoprazole 40 MG EC tablet  Commonly known as:  PROTONIX   40 mg, Oral, Daily      pravastatin 40 MG tablet  Commonly known as:  PRAVACHOL   40 mg, Oral, Every Night at Bedtime      STOOL SOFTENER PO   1 capsule, Oral, 2 Times Daily      tamsulosin 0.4 MG capsule 24 hr capsule  Commonly known as:  FLOMAX   TAKE 1 CAPSULE BY MOUTH DAILY. DX=N40.0      TRELEGY ELLIPTA 100-62.5-25 MCG/INH aerosol powder   Generic drug:  Fluticasone-Umeclidin-Vilant   1 each, Inhalation, Daily         Stop These Medications    losartan-hydrochlorothiazide 50-12.5 MG per tablet  Commonly known as:  HYZAAR     MULTI-VITAMIN DAILY PO     NOVOLOG FLEXPEN 100 UNIT/ML solution  pen-injector sc pen  Generic drug:  insulin aspart     pioglitazone 30 MG tablet  Commonly known as:  ACTOS     predniSONE 20 MG tablet  Commonly known as:  DELTASONE     SUPER B COMPLEX PO            No Known Allergies      Discharge Disposition:  Home-Health Care Svc    Discharge Diet:  Diet Order   Procedures   • Diet Regular; Cardiac, Consistent Carbohydrate       Discharge Activity:   Activity Instructions     As tolerated           as tolerated    CODE STATUS:    Code Status and Medical Interventions:   Ordered at: 12/12/19 1432     Code Status:    CPR     Medical Interventions (Level of Support Prior to Arrest):    Full       Future Appointments   Date Time Provider Department Center   1/16/2020  8:15 AM Ricardo Anderson MD [facility] None     Additional Instructions for the Follow-ups that You Need to Schedule     Ambulatory Referral to Home Health   As directed      Face to Face Visit Date:  12/18/2019    Follow-up provider for Plan of Care?:  I treated the patient in an acute care facility and will not continue treatment after discharge.    Follow-up provider:  RICARDO ANDERSON [[id number]]    Reason/Clinical Findings:  recurrent right pleural effusion, right lung mass, hypoxia    Describe mobility limitations that make leaving home difficult:  requires the assistance of another to leave the home    Nursing/Therapeutic Services Requested:  Skilled Nursing    Skilled nursing orders:  Medication education O2 instruction    Frequency:  1 Week 1         Discharge Follow-up with PCP   As directed       Currently Documented PCP:    Ricardo Anderson MD    PCP Phone Number:    [phone]     Follow Up Details:  Dr. Anderson (PCP) in 1-2 weeks         Discharge Follow-up with Specified Provider: Dr. Rutledge (Pulm); 1 Week   As directed      To:  Dr. Rutledge (Pulm)    Follow Up:  1 Week            Contact information for follow-up providers     [facility] REFERRAL [place] [facility] .     Specialty:  Home Health Services  Contact information:  [address]  [place] [address]           Soraya Anderson MD .    Specialty:  Family Medicine  Why:  Dr. Anderson (PCP) in 1-2 weeks  Contact information:  [address]  [address]  [place] [address]  [phone]                   Contact information for after-discharge care     Durable Medical Equipment     [facility] .    Service:  Durable Medical Equipment  Contact information:  [address]  [place] [address]  [phone]                             Additional Instructions for the Follow-ups that You Need to Schedule     Ambulatory Referral to Home Health   As directed      Face to Face Visit Date:  12/18/2019    Follow-up provider for Plan of Care?:  I treated the patient in an acute care facility and will not continue treatment after discharge.    Follow-up provider:  SORAYA ANDERSON [[id number]]    Reason/Clinical Findings:  recurrent right pleural effusion, right lung mass, hypoxia    Describe mobility limitations that make leaving home difficult:  requires the assistance of another to leave the home    Nursing/Therapeutic Services Requested:  Skilled Nursing    Skilled nursing orders:  Medication education O2 instruction    Frequency:  1 Week 1         Discharge Follow-up with PCP   As directed       Currently Documented PCP:    Soraya Anderson MD    PCP Phone Number:    [phone]     Follow Up Details:  Dr. Anderson (PCP) in 1-2 weeks         Discharge Follow-up with Specified Provider: Dr. Rutledge (Pulm); 1 Week   As directed      To:  Dr. Rutledge (Pulm)    Follow Up:  1 Week           Time Spent on Discharge:  Greater than 30 minutes      Kojo Norris MD  [facility]  12/18/19  5:33 PM

## 2019-12-19 NOTE — OUTREACH NOTE
Prep Survey      Responses   Facility patient discharged from?  [facility]   Is patient eligible?  Yes   Discharge diagnosis  Right lower lobe lung mass    Does the patient have one of the following disease processes/diagnoses(primary or secondary)?  Other   Does the patient have Home health ordered?  Yes   What is the Home health agency?   [facility]    Is there a DME ordered?  Yes   What DME was ordered?  Oxygen per [place]   Prep survey completed?  Yes          Lupe Jaimes RN

## 2019-12-19 NOTE — PROGRESS NOTES
Case Management Discharge Note      Final Note: Pt was dc'd last PM with [facility] and home o2    Provided post acute provider list?: Yes  Post Acute Provider Lists: Home Health  Post Acuite Provider List: Delivered  Delivered To: Patient, Support Person  Method of Delivery: In person    Destination      No service has been selected for the patient.      Durable Medical Equipment      Service Provider Request Status Selected Services Address Phone Number Fax Number    [facility] Selected Durable Medical Equipment [address], [place] [address] [phone]      Dialysis/Infusion      No service has been selected for the patient.      Home Medical Care - Selection Complete      Service Provider Request Status Selected Services Address Phone Number Fax Number    [facility] [place] Selected Home Health Services [address] [place] [address] [address] [phone]       Zahida Martini, RN 12/18/2019 1808    msg about dc left on VM                  Therapy      No service has been selected for the patient.      Community Resources      No service has been selected for the patient.        Transportation Services  Private: Car    Final Discharge Disposition Code: 01 - home or self-care

## 2019-12-20 NOTE — TELEPHONE ENCOUNTER
"Pt. Was  Discharged on 12/18 from [address] and has questions on all medications. Daughter is calling asking about Insulin and questions answered per AVS and she states does not have 3 pages of medication list, she has some pages and has misplaced others, AVS read to her and she listed all medications as AVS states, questions answered. Told to call back if needed.     Reason for Disposition  • Caller has medication question only, adult not sick, and triager answers question    Additional Information  • Negative: Drug overdose and nurse unable to answer question  • Negative: Caller requesting information not related to medicine  • Negative: Caller requesting a prescription for Strep throat and has a positive culture result  • Negative: Rash while taking a medication or within 3 days of stopping it  • Negative: Immunization reaction suspected  • Negative: [1] Asthma and [2] having symptoms of asthma (cough, wheezing, etc)  • Negative: MORE THAN A DOUBLE DOSE of a prescription or over-the-counter (OTC) drug  • Negative: [1] DOUBLE DOSE (an extra dose or lesser amount) of over-the-counter (OTC) drug AND [2] any symptoms (e.g., dizziness, nausea, pain, sleepiness)  • Negative: [1] DOUBLE DOSE (an extra dose or lesser amount) of prescription drug AND [2] any symptoms (e.g., dizziness, nausea, pain, sleepiness)  • Negative: Took another person's prescription drug  • Negative: [1] DOUBLE DOSE (an extra dose or lesser amount) of prescription drug AND [2] NO symptoms (Exception: a double dose of antibiotics)  • Negative: Diabetes drug error or overdose (e.g., insulin or extra dose)  • Negative: [1] Request for URGENT new prescription or refill of \"essential\" medication (i.e., likelihood of harm to patient if not taken) AND [2] triager unable to fill per unit policy  • Negative: [1] Prescription not at pharmacy AND [2] was prescribed today by PCP  • Negative: Pharmacy calling with prescription questions and triager " "unable to answer question  • Negative: Caller has URGENT medication question about med that PCP prescribed and triager unable to answer question  • Negative: Caller has NON-URGENT medication question about med that PCP prescribed and triager unable to answer question  • Negative: Caller requesting a NON-URGENT new prescription or refill and triager unable to refill per unit policy  • Negative: Caller has medication question about med not prescribed by PCP and triager unable to answer question (e.g., compatibility with other med, storage)  • Negative: [1] DOUBLE DOSE (an extra dose or lesser amount) of over-the-counter (OTC) drug AND [2] NO symptoms  • Negative: [1] DOUBLE DOSE (an extra dose or lesser amount) of antibiotic drug AND [2] NO symptoms    Answer Assessment - Initial Assessment Questions  1. SYMPTOMS: \"Do you have any symptoms?\"      no  2. SEVERITY: If symptoms are present, ask \"Are they mild, moderate or severe?\"      Needing answers on medications    Protocols used: MEDICATION QUESTION CALL-ADULT-      "

## 2019-12-23 NOTE — OUTREACH NOTE
Medical Week 1 Survey      Responses   Facility patient discharged from?  [place]   Does the patient have one of the following disease processes/diagnoses(primary or secondary)?  Other   Is there a successful TCM telephone encounter documented?  No   Week 1 attempt successful?  No   Unsuccessful attempts  Attempt 1          Pablo Crawford RN

## 2019-12-26 NOTE — OUTREACH NOTE
Medical Week 1 Survey      Responses   Facility patient discharged from?  [place]   Does the patient have one of the following disease processes/diagnoses(primary or secondary)?  Other   Is there a successful TCM telephone encounter documented?  No   Week 1 attempt successful?  Yes   Call start time  1606   Call end time  1613   Discharge diagnosis  Right lower lobe lung mass    Meds reviewed with patient/caregiver?  Yes   Is the patient having any side effects they believe may be caused by any medication additions or changes?  No   Does the patient have all medications ordered at discharge?  Yes   Is the patient taking all medications as directed (includes completed medication regime)?  Yes   Does the patient have a primary care provider?   Yes   Does the patient have an appointment with their PCP within 7 days of discharge?  Greater than 7 days   What is preventing the patient from scheduling follow up appointments within 7 days of discharge?  Earlier appointment not available   Nursing Interventions  Verified appointment date/time/provider, Educated patient on importance of making appointment   Has the patient kept scheduled appointments due by today?  N/A   What is the Home health agency?   [facility]    Has home health visited the patient within 72 hours of discharge?  Yes   What DME was ordered?  Oxygen per [place]   Has all DME been delivered?  Yes   Psychosocial issues?  No   Did the patient receive a copy of their discharge instructions?  Yes   Nursing interventions  Reviewed instructions with patient   What is the patient's perception of their health status since discharge?  Same   Is the patient/caregiver able to teach back signs and symptoms related to disease process for when to call PCP?  Yes   Is the patient/caregiver able to teach back signs and symptoms related to disease process for when to call 911?  Yes   Is the patient/caregiver able to teach back the hierarchy of who to call/visit for  symptoms/problems? PCP, Specialist, Home health nurse, Urgent Care, ED, 911  Yes   Additional teach back comments  Enc pt to watch  CHO in diet--does not seem like he follows a low carb diet.    Week 1 call completed?  Yes          Marlys Owusu RN

## 2020-01-01 ENCOUNTER — TELEPHONE (OUTPATIENT)
Dept: CARDIOLOGY | Facility: CLINIC | Age: 75
End: 2020-01-01

## 2020-01-01 ENCOUNTER — OFFICE VISIT (OUTPATIENT)
Dept: INTERNAL MEDICINE | Facility: CLINIC | Age: 75
End: 2020-01-01

## 2020-01-01 ENCOUNTER — TRANSCRIBE ORDERS (OUTPATIENT)
Dept: ADMINISTRATIVE | Facility: HOSPITAL | Age: 75
End: 2020-01-01

## 2020-01-01 ENCOUNTER — READMISSION MANAGEMENT (OUTPATIENT)
Dept: CALL CENTER | Facility: HOSPITAL | Age: 75
End: 2020-01-01

## 2020-01-01 ENCOUNTER — HOSPITAL ENCOUNTER (OUTPATIENT)
Dept: CT IMAGING | Facility: HOSPITAL | Age: 75
Discharge: HOME | End: 2020-02-08
Admitting: INTERNAL MEDICINE

## 2020-01-01 ENCOUNTER — OFFICE VISIT (OUTPATIENT)
Dept: CARDIOLOGY | Facility: CLINIC | Age: 75
End: 2020-01-01

## 2020-01-01 VITALS
OXYGEN SATURATION: 95 % | BODY MASS INDEX: 40.16 KG/M2 | SYSTOLIC BLOOD PRESSURE: 136 MMHG | WEIGHT: 303 LBS | DIASTOLIC BLOOD PRESSURE: 63 MMHG | HEART RATE: 58 BPM | RESPIRATION RATE: 20 BRPM | HEIGHT: 73 IN

## 2020-01-01 VITALS
HEART RATE: 64 BPM | WEIGHT: 298 LBS | OXYGEN SATURATION: 91 % | SYSTOLIC BLOOD PRESSURE: 136 MMHG | DIASTOLIC BLOOD PRESSURE: 58 MMHG | BODY MASS INDEX: 39.49 KG/M2 | HEIGHT: 73 IN | RESPIRATION RATE: 20 BRPM

## 2020-01-01 DIAGNOSIS — R06.02 SHORTNESS OF BREATH ON EXERTION: ICD-10-CM

## 2020-01-01 DIAGNOSIS — N40.0 BENIGN PROSTATIC HYPERPLASIA WITHOUT LOWER URINARY TRACT SYMPTOMS: ICD-10-CM

## 2020-01-01 DIAGNOSIS — E78.5 HYPERLIPIDEMIA, UNSPECIFIED HYPERLIPIDEMIA TYPE: ICD-10-CM

## 2020-01-01 DIAGNOSIS — R16.0 LIVER MASS: ICD-10-CM

## 2020-01-01 DIAGNOSIS — R91.8 RIGHT LOWER LOBE LUNG MASS: Primary | ICD-10-CM

## 2020-01-01 DIAGNOSIS — E11.9 TYPE 2 DIABETES MELLITUS WITHOUT COMPLICATION, WITH LONG-TERM CURRENT USE OF INSULIN (HCC): ICD-10-CM

## 2020-01-01 DIAGNOSIS — E11.8 TYPE 2 DIABETES MELLITUS WITH COMPLICATION, WITH LONG-TERM CURRENT USE OF INSULIN (HCC): ICD-10-CM

## 2020-01-01 DIAGNOSIS — D64.9 ANEMIA, UNSPECIFIED TYPE: Primary | ICD-10-CM

## 2020-01-01 DIAGNOSIS — E27.8 ADRENAL MASS (HCC): Primary | ICD-10-CM

## 2020-01-01 DIAGNOSIS — Z79.4 TYPE 2 DIABETES MELLITUS WITHOUT COMPLICATION, WITH LONG-TERM CURRENT USE OF INSULIN (HCC): ICD-10-CM

## 2020-01-01 DIAGNOSIS — Z79.4 TYPE 2 DIABETES MELLITUS WITH COMPLICATION, WITH LONG-TERM CURRENT USE OF INSULIN (HCC): ICD-10-CM

## 2020-01-01 DIAGNOSIS — R06.02 SHORTNESS OF BREATH ON EXERTION: Primary | ICD-10-CM

## 2020-01-01 DIAGNOSIS — I25.10 CORONARY ARTERY DISEASE WITHOUT ANGINA PECTORIS, UNSPECIFIED VESSEL OR LESION TYPE, UNSPECIFIED WHETHER NATIVE OR TRANSPLANTED HEART: ICD-10-CM

## 2020-01-01 DIAGNOSIS — I10 ESSENTIAL HYPERTENSION: ICD-10-CM

## 2020-01-01 DIAGNOSIS — I25.10 CORONARY ARTERY DISEASE WITHOUT ANGINA PECTORIS, UNSPECIFIED VESSEL OR LESION TYPE, UNSPECIFIED WHETHER NATIVE OR TRANSPLANTED HEART: Primary | ICD-10-CM

## 2020-01-01 LAB
ALBUMIN SERPL-MCNC: 3.9 G/DL (ref 3.5–5.2)
ALBUMIN/GLOB SERPL: 1.3 G/DL
ALP SERPL-CCNC: 82 U/L (ref 39–117)
ALT SERPL-CCNC: 10 U/L (ref 1–41)
AST SERPL-CCNC: 13 U/L (ref 1–40)
BASOPHILS # BLD AUTO: 0.03 10*3/MM3 (ref 0–0.2)
BASOPHILS NFR BLD AUTO: 0.5 % (ref 0–1.5)
BILIRUB SERPL-MCNC: 0.7 MG/DL (ref 0.2–1.2)
BUN SERPL-MCNC: 13 MG/DL (ref 8–23)
BUN/CREAT SERPL: 10.2 (ref 7–25)
CALCIUM SERPL-MCNC: 8.7 MG/DL (ref 8.6–10.5)
CHLORIDE SERPL-SCNC: 94 MMOL/L (ref 98–107)
CO2 SERPL-SCNC: 29.2 MMOL/L (ref 22–29)
CREAT BLDA-MCNC: 1.3 MG/DL (ref 0.6–1.3)
CREAT SERPL-MCNC: 1.28 MG/DL (ref 0.76–1.27)
EOSINOPHIL # BLD AUTO: 0.14 10*3/MM3 (ref 0–0.4)
EOSINOPHIL NFR BLD AUTO: 2.3 % (ref 0.3–6.2)
ERYTHROCYTE [DISTWIDTH] IN BLOOD BY AUTOMATED COUNT: 14.4 % (ref 12.3–15.4)
FUNGUS WND CULT: NORMAL
GLOBULIN SER CALC-MCNC: 3.1 GM/DL
GLUCOSE SERPL-MCNC: 155 MG/DL (ref 65–99)
HCT VFR BLD AUTO: 36 % (ref 37.5–51)
HGB BLD-MCNC: 11.7 G/DL (ref 13–17.7)
IMM GRANULOCYTES # BLD AUTO: 0.04 10*3/MM3 (ref 0–0.05)
IMM GRANULOCYTES NFR BLD AUTO: 0.7 % (ref 0–0.5)
LYMPHOCYTES # BLD AUTO: 0.86 10*3/MM3 (ref 0.7–3.1)
LYMPHOCYTES NFR BLD AUTO: 14.1 % (ref 19.6–45.3)
MCH RBC QN AUTO: 27.3 PG (ref 26.6–33)
MCHC RBC AUTO-ENTMCNC: 32.5 G/DL (ref 31.5–35.7)
MCV RBC AUTO: 83.9 FL (ref 79–97)
MONOCYTES # BLD AUTO: 0.44 10*3/MM3 (ref 0.1–0.9)
MONOCYTES NFR BLD AUTO: 7.2 % (ref 5–12)
MYCOBACTERIUM SPEC CULT: NORMAL
NEUTROPHILS # BLD AUTO: 4.59 10*3/MM3 (ref 1.7–7)
NEUTROPHILS NFR BLD AUTO: 75.2 % (ref 42.7–76)
NIGHT BLUE STAIN TISS: NORMAL
NRBC BLD AUTO-RTO: 0 /100 WBC (ref 0–0.2)
PLATELET # BLD AUTO: 139 10*3/MM3 (ref 140–450)
POTASSIUM SERPL-SCNC: 4 MMOL/L (ref 3.5–5.2)
PROT SERPL-MCNC: 7 G/DL (ref 6–8.5)
RBC # BLD AUTO: 4.29 10*6/MM3 (ref 4.14–5.8)
SODIUM SERPL-SCNC: 136 MMOL/L (ref 136–145)
WBC # BLD AUTO: 6.1 10*3/MM3 (ref 3.4–10.8)

## 2020-01-01 PROCEDURE — 99214 OFFICE O/P EST MOD 30 MIN: CPT | Performed by: FAMILY MEDICINE

## 2020-01-01 PROCEDURE — 74170 CT ABD WO CNTRST FLWD CNTRST: CPT

## 2020-01-01 PROCEDURE — 82565 ASSAY OF CREATININE: CPT

## 2020-01-01 PROCEDURE — 93000 ELECTROCARDIOGRAM COMPLETE: CPT | Performed by: INTERNAL MEDICINE

## 2020-01-01 PROCEDURE — 25010000002 IOPAMIDOL 61 % SOLUTION: Performed by: INTERNAL MEDICINE

## 2020-01-01 PROCEDURE — 99204 OFFICE O/P NEW MOD 45 MIN: CPT | Performed by: INTERNAL MEDICINE

## 2020-01-01 RX ORDER — FUROSEMIDE 20 MG/1
20 TABLET ORAL DAILY
Qty: 30 TABLET | Refills: 0 | Status: SHIPPED | OUTPATIENT
Start: 2020-01-01

## 2020-01-01 RX ORDER — PRAVASTATIN SODIUM 40 MG
TABLET ORAL
Qty: 30 TABLET | Refills: 6 | Status: SHIPPED | OUTPATIENT
Start: 2020-01-01

## 2020-01-01 RX ORDER — TAMSULOSIN HYDROCHLORIDE 0.4 MG/1
CAPSULE ORAL
Qty: 30 CAPSULE | Refills: 0 | Status: SHIPPED | OUTPATIENT
Start: 2020-01-01

## 2020-01-01 RX ORDER — AMLODIPINE BESYLATE 10 MG/1
5 TABLET ORAL DAILY
Qty: 30 TABLET | Refills: 0 | Status: SHIPPED | OUTPATIENT
Start: 2020-01-01

## 2020-01-01 RX ORDER — LOSARTAN POTASSIUM 50 MG/1
50 TABLET ORAL
Qty: 30 TABLET | Refills: 0 | Status: SHIPPED | OUTPATIENT
Start: 2020-01-01

## 2020-01-01 RX ADMIN — IOPAMIDOL 95 ML: 612 INJECTION, SOLUTION INTRAVENOUS at 10:16

## 2020-01-06 NOTE — OUTREACH NOTE
Medical Week 2 Survey      Responses   Facility patient discharged from?  [place]   Does the patient have one of the following disease processes/diagnoses(primary or secondary)?  Other   Week 2 attempt successful?  Yes   Call start time  1200   Discharge diagnosis  Right lower lobe lung mass    Call end time  1204   Is patient permission given to speak with other caregiver?  No   Meds reviewed with patient/caregiver?  Yes   Is the patient having any side effects they believe may be caused by any medication additions or changes?  No   Does the patient have all medications ordered at discharge?  Yes   Is the patient taking all medications as directed (includes completed medication regime)?  Yes   Does the patient have a primary care provider?   Yes   Comments regarding PCP  Ricardo Anderson MD PCP   Has the patient kept scheduled appointments due by today?  Yes   What is the Home health agency?   [facility] continued.    Psychosocial issues?  No   Did the patient receive a copy of their discharge instructions?  Yes   Nursing interventions  Reviewed instructions with patient   What is the patient's perception of their health status since discharge?  Same   Is the patient/caregiver able to teach back the hierarchy of who to call/visit for symptoms/problems? PCP, Specialist, Home health nurse, Urgent Care, ED, 911  Yes   Week 2 Call Completed?  Yes          Cindi Alexander RN

## 2020-01-16 NOTE — PROGRESS NOTES
Subjective   Pablo Collier is a 75 y.o. male.     Chief Complaint   Patient presents with   • Shortness of Breath         History of Present Illness     Patient is currently type II diabetic.  During his time in the hospital, it was noted that his sugars are running slightly low, they decided to decrease his Levemir.  Patient was taking 50 units at night.  Now he is currently taking only 20 units in the evening.  Patient states that he is still feeling well at this current dose.  States that his fasting blood glucose levels at this morning was 153.  Not having side effects of the Levemir.    Patient was discharged from the hospital 3 weeks back.  Patient states that he was given oxygen on discharge.  Patient notes that he is doing much better now that he has home O2.  He states that he is continue using the trilogy inhaler, but is not sure how much it is really helping.  Patient is currently seeing pulmonology, and patient is scheduled for to have further testing done.    Patient also has coronary artery disease.  Patient saw his cardiologist 3 days ago.  There is records that are trying to be obtained, so for the patient can have further evaluation.  At the present time he is currently taking pravastatin 40 mg daily, metoprolol tartrate 25 mg twice daily as well as a baby aspirin.  He does not have any side effects of these medications.  Patient states that the swelling in both of his feet have improved, and notes that he is also taking his Lasix.    The following portions of the patient's history were reviewed and updated as appropriate: allergies, current medications, past family history, past medical history, past social history, past surgical history and problem list.    Review of Systems   Constitutional: Negative for chills and fever.   HENT: Negative for congestion, rhinorrhea, sinus pain and sore throat.    Eyes: Negative for photophobia and visual disturbance.   Respiratory: Positive for shortness of  breath. Negative for cough and chest tightness.    Cardiovascular: Negative for chest pain and palpitations.   Gastrointestinal: Negative for diarrhea, nausea and vomiting.   Genitourinary: Negative for dysuria, frequency and urgency.   Skin: Negative for rash and wound.   Neurological: Negative for dizziness and syncope.   Psychiatric/Behavioral: Negative for behavioral problems and confusion.       Objective   Physical Exam   Constitutional: He is oriented to person, place, and time. He appears well-developed and well-nourished.   HENT:   Head: Normocephalic and atraumatic.   Right Ear: External ear normal.   Left Ear: External ear normal.   Eyes: EOM are normal.   Neck: Normal range of motion. Neck supple.   Cardiovascular: Normal rate, regular rhythm and normal heart sounds.   Pulmonary/Chest: Effort normal and breath sounds normal. No respiratory distress.   Musculoskeletal: Normal range of motion.   Lymphadenopathy:     He has no cervical adenopathy.   Neurological: He is alert and oriented to person, place, and time.   Skin: Skin is warm.   Psychiatric: He has a normal mood and affect. His behavior is normal.   Nursing note and vitals reviewed.      Vitals:    01/16/20 0826   BP: 136/63   Pulse: 58   Resp: 20   SpO2: 95%     Body mass index is 39.98 kg/m².      Assessment/Plan   Pablo was seen today for shortness of breath.    Diagnoses and all orders for this visit:    Shortness of breath on exertion  -     CBC & Differential  -     Comprehensive Metabolic Panel  -     Discussed with patient that he should continue his home O2.  I also recommend patient that he needs to continue Trelegy.  Patient really needs to follow-up with pulmonology.  Is unclear if his shortness of breath has any underlying cardiac involvement, however he is currently under the care of cardiology at the current time.  He has noted at today's visit how much better he feels well having home O2.    Type 2 diabetes mellitus without  complication, with long-term current use of insulin (CMS/[facility])         -     Patient can continue taking his Levemir of 20 units nightly.  We will continue the patient check his fasting blood glucose levels.  If they consistently stay high, may need to readjust his medication.             Coronary artery disease without angina pectoris, unspecified vessel or lesion type, unspecified whether native or transplanted heart         -    Continue baby aspirin, pravastatin, and metoprolol tartrate.          No follow-ups on file.    Dictated utilizing Dragon Voice Recognition Software

## 2020-01-18 NOTE — PROGRESS NOTES
Please inform the patient of the following abnormal results.  Platelets still low and patient also has anemia. Would benefit from having patient seeing hematology.   Serum creatnine levels are still elevated, but improving.

## 2020-01-23 NOTE — OUTREACH NOTE
Medical Week 4 Survey      Responses   Facility patient discharged from?  [place]   Does the patient have one of the following disease processes/diagnoses(primary or secondary)?  Other   Week 4 attempt successful?  Yes   Call start time  1623   Call end time  1624   Discharge diagnosis  Right lower lobe lung mass    Meds reviewed with patient/caregiver?  Yes   Is the patient having any side effects they believe may be caused by any medication additions or changes?  No   Is the patient taking all medications as directed (includes completed medication regime)?  Yes   Has the patient kept scheduled appointments due by today?  Yes   Is the patient still receiving Home Health Services?  N/A   Psychosocial issues?  No   What is the patient's perception of their health status since discharge?  Improving   Is the patient/caregiver able to teach back signs and symptoms related to disease process for when to call PCP?  Yes   Is the patient/caregiver able to teach back signs and symptoms related to disease process for when to call 911?  Yes   Is the patient/caregiver able to teach back the hierarchy of who to call/visit for symptoms/problems? PCP, Specialist, Home health nurse, Urgent Care, ED, 911  Yes   Additional teach back comments  Says he is ok and has seen multiple [facility]   Week 4 Call Completed?  Yes   Would the patient like one additional call?  No   Graduated  Yes   Did the patient feel the follow up calls were helpful during their recovery period?  Yes   Was the number of calls appropriate?  Yes          China Sanders RN

## 2020-02-20 ENCOUNTER — APPOINTMENT (OUTPATIENT)
Dept: OTHER | Facility: HOSPITAL | Age: 75
End: 2020-02-20

## 2020-02-24 ENCOUNTER — APPOINTMENT (OUTPATIENT)
Dept: CT IMAGING | Facility: HOSPITAL | Age: 75
End: 2020-02-24
